# Patient Record
Sex: MALE | Race: WHITE | NOT HISPANIC OR LATINO
[De-identification: names, ages, dates, MRNs, and addresses within clinical notes are randomized per-mention and may not be internally consistent; named-entity substitution may affect disease eponyms.]

---

## 2019-06-06 ENCOUNTER — APPOINTMENT (OUTPATIENT)
Dept: DERMATOLOGY | Facility: CLINIC | Age: 84
End: 2019-06-06
Payer: MEDICARE

## 2019-06-06 PROBLEM — Z00.00 ENCOUNTER FOR PREVENTIVE HEALTH EXAMINATION: Status: ACTIVE | Noted: 2019-06-06

## 2019-06-06 PROCEDURE — 99203 OFFICE O/P NEW LOW 30 MIN: CPT

## 2019-08-14 ENCOUNTER — NON-APPOINTMENT (OUTPATIENT)
Age: 84
End: 2019-08-14

## 2019-08-14 ENCOUNTER — APPOINTMENT (OUTPATIENT)
Dept: CARDIOLOGY | Facility: CLINIC | Age: 84
End: 2019-08-14
Payer: MEDICARE

## 2019-08-14 VITALS
DIASTOLIC BLOOD PRESSURE: 84 MMHG | WEIGHT: 163 LBS | BODY MASS INDEX: 24.71 KG/M2 | RESPIRATION RATE: 14 BRPM | HEART RATE: 59 BPM | HEIGHT: 68 IN | OXYGEN SATURATION: 95 % | SYSTOLIC BLOOD PRESSURE: 160 MMHG

## 2019-08-14 DIAGNOSIS — I10 ESSENTIAL (PRIMARY) HYPERTENSION: ICD-10-CM

## 2019-08-14 DIAGNOSIS — K42.9 UMBILICAL HERNIA W/OUT OBSTRUCTION OR GANGRENE: ICD-10-CM

## 2019-08-14 DIAGNOSIS — E78.5 HYPERLIPIDEMIA, UNSPECIFIED: ICD-10-CM

## 2019-08-14 DIAGNOSIS — Z87.891 PERSONAL HISTORY OF NICOTINE DEPENDENCE: ICD-10-CM

## 2019-08-14 DIAGNOSIS — I48.92 UNSPECIFIED ATRIAL FLUTTER: ICD-10-CM

## 2019-08-14 DIAGNOSIS — R94.39 ABNORMAL RESULT OF OTHER CARDIOVASCULAR FUNCTION STUDY: ICD-10-CM

## 2019-08-14 DIAGNOSIS — Z78.9 OTHER SPECIFIED HEALTH STATUS: ICD-10-CM

## 2019-08-14 PROCEDURE — 99204 OFFICE O/P NEW MOD 45 MIN: CPT

## 2019-08-14 PROCEDURE — 93000 ELECTROCARDIOGRAM COMPLETE: CPT

## 2019-08-14 RX ORDER — UBIDECARENONE 200 MG
CAPSULE ORAL
Refills: 0 | Status: ACTIVE | COMMUNITY

## 2019-08-14 RX ORDER — ATORVASTATIN CALCIUM 40 MG/1
40 TABLET, FILM COATED ORAL
Qty: 90 | Refills: 1 | Status: ACTIVE | COMMUNITY

## 2019-08-14 RX ORDER — BIOTIN 10 MG
TABLET ORAL
Refills: 0 | Status: ACTIVE | COMMUNITY

## 2019-08-14 NOTE — DISCUSSION/SUMMARY
[FreeTextEntry1] : 1. Plan two-week event monitor to evaluate for further atrial flutter.\par 2. Will plan to check CT of the coronaries to further evaluate his abnormal stress test.\par 3. Continue anticoagulation for stroke prophylaxis for atrial fibrillation. He will continue off aspirin. \par 4. Continue other current cardiac meds in doses as noted above for CAD, hypertension, atrial flutter.\par 5. Follow up here in six weeks.

## 2019-08-14 NOTE — PHYSICAL EXAM
[General Appearance - In No Acute Distress] : no acute distress [Normal Conjunctiva] : the conjunctiva exhibited no abnormalities [Normal Oral Mucosa] : normal oral mucosa [Auscultation Breath Sounds / Voice Sounds] : lungs were clear to auscultation bilaterally [Abdomen Soft] : soft [Abdomen Tenderness] : non-tender [Cyanosis, Localized] : no localized cyanosis [Nail Clubbing] : no clubbing of the fingernails [Abnormal Walk] : normal gait [Skin Color & Pigmentation] : normal skin color and pigmentation [Oriented To Time, Place, And Person] : oriented to person, place, and time [Affect] : the affect was normal [Normal Rate] : normal [Irregularly Irregular] : irregularly irregular [Normal S2] : normal S2 [Normal S1] : normal S1 [No Murmur] : no murmurs heard [FreeTextEntry1] : No JVD, no carotid bruits. [S3] : no S3 [S4] : no S4

## 2019-08-14 NOTE — ASSESSMENT
[FreeTextEntry1] : EKG: Sinus rhythm with atrial and ventricular ectopic beats. No ST or T wave changes.\par \par 89-year-old man with a past medical history of hypertension and dyslipidemia who recently presented for hernia surgery and was found to be in rapid atrial flutter. Patient was successfully cardioverted back to sinus rhythm and remains in sinus rhythm here today with PACs and PVCs. He is now asymptomatic. He apparently had some dizziness associated with the atrial flutter but no other symptoms. No evidence of congestive heart failure and his echo at that time demonstrated a normal EF with mild to moderate mitral regurgitation. He did have a stress test prior to surgery which showed a reversible inferior defect. It was determined at that time that no further workup would be done he would be treated medically.

## 2019-08-14 NOTE — HISTORY OF PRESENT ILLNESS
[FreeTextEntry1] : Patient comes to the office today to change cardiologists. He was recently seen at [facility] where he presented for hernia surgery. He had been cleared by his prior cardiologist including a nuclear stress test which demonstrated reversible inferior defect which was ultimately decided to be treated medically with no further workup. When he arrived for his surgery he was noted to be tachycardic and found to be in rapid atrial flutter. He underwent MAYKEL guided DC cardioversion successfully back to sinus rhythm. He was kept for a day and discharged home on Eliquis. He notes that he had no real symptoms prior to coming in but that he had been feeling a little bit lightheaded at times in the days leading up to this episode. Since the cardioversion he has had no further lightheadedness. He reports no palpitations at all. Patient denies chest pain, shortness of breath, orthopnea, presyncope, syncope.

## 2019-08-23 ENCOUNTER — RX RENEWAL (OUTPATIENT)
Age: 84
End: 2019-08-23

## 2019-08-29 ENCOUNTER — FORM ENCOUNTER (OUTPATIENT)
Age: 84
End: 2019-08-29

## 2019-08-30 ENCOUNTER — OUTPATIENT (OUTPATIENT)
Dept: OUTPATIENT SERVICES | Facility: HOSPITAL | Age: 84
LOS: 1 days | End: 2019-08-30
Payer: MEDICARE

## 2019-08-30 DIAGNOSIS — R94.39 ABNORMAL RESULT OF OTHER CARDIOVASCULAR FUNCTION STUDY: ICD-10-CM

## 2019-08-30 PROCEDURE — 75571 CT HRT W/O DYE W/CA TEST: CPT | Mod: 26

## 2019-08-30 PROCEDURE — 75571 CT HRT W/O DYE W/CA TEST: CPT

## 2019-09-06 DIAGNOSIS — R93.1 ABNORMAL FINDINGS ON DIAGNOSTIC IMAGING OF HEART AND CORONARY CIRCULATION: ICD-10-CM

## 2019-09-11 ENCOUNTER — APPOINTMENT (OUTPATIENT)
Dept: DERMATOLOGY | Facility: CLINIC | Age: 84
End: 2019-09-11

## 2019-10-07 ENCOUNTER — APPOINTMENT (OUTPATIENT)
Dept: CARDIOLOGY | Facility: CLINIC | Age: 84
End: 2019-10-07

## 2019-10-12 ENCOUNTER — TRANSCRIPTION ENCOUNTER (OUTPATIENT)
Age: 84
End: 2019-10-12

## 2019-10-28 ENCOUNTER — RX RENEWAL (OUTPATIENT)
Age: 84
End: 2019-10-28

## 2019-10-29 RX ORDER — APIXABAN 5 MG/1
5 TABLET, FILM COATED ORAL
Qty: 180 | Refills: 0 | Status: ACTIVE | COMMUNITY
Start: 1900-01-01 | End: 1900-01-01

## 2019-10-29 RX ORDER — METOPROLOL SUCCINATE 25 MG/1
25 TABLET, EXTENDED RELEASE ORAL
Qty: 45 | Refills: 0 | Status: ACTIVE | COMMUNITY
Start: 1900-01-01 | End: 1900-01-01

## 2021-10-22 ENCOUNTER — TRANSCRIPTION ENCOUNTER (OUTPATIENT)
Age: 86
End: 2021-10-22

## 2022-12-22 ENCOUNTER — APPOINTMENT (OUTPATIENT)
Dept: SURGICAL ONCOLOGY | Facility: CLINIC | Age: 87
End: 2022-12-22

## 2022-12-22 VITALS
HEART RATE: 69 BPM | WEIGHT: 155 LBS | SYSTOLIC BLOOD PRESSURE: 155 MMHG | TEMPERATURE: 97.1 F | HEIGHT: 68 IN | BODY MASS INDEX: 23.49 KG/M2 | OXYGEN SATURATION: 93 % | DIASTOLIC BLOOD PRESSURE: 93 MMHG

## 2022-12-22 PROCEDURE — 99205 OFFICE O/P NEW HI 60 MIN: CPT

## 2022-12-27 NOTE — ASSESSMENT
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 We discussed the risks, benefits, and alternatives of the procedure with the patient, he expressed understanding and agree to proceed.

## 2022-12-27 NOTE — CONSULT LETTER
[Dear  ___] : Dear  [unfilled], [Consult Letter:] : I had the pleasure of evaluating your patient, [unfilled]. [Please see my note below.] : Please see my note below. [Consult Closing:] : Thank you very much for allowing me to participate in the care of this patient.  If you have any questions, please do not hesitate to contact me. [Sincerely,] : Sincerely, [FreeTextEntry3] : Sonny Edgar MD, MPH, FACS, FSSO\par , [facility]\par [facility]\par Associate Professor of Surgery\par [employer] [facility]

## 2022-12-27 NOTE — HISTORY OF PRESENT ILLNESS
[de-identified] : Mr. Francisco Castellanos is a 89 year old male with newly diagnosed squamous cell carcinoma. Referred by Dr. Funez.\par \par Francisco had a shave biopsy of the right central malar cheek performed on 10/24/22 which revealed invasive squamous cell carcinoma.\par \par Reports long standing history of sun exposure without the use of sunscreen. History of sunburns. Denies any other concerning lesions or masses. Denies bleeding or pruritic moles. Denies pain or constitutional changes. 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 He is  for 9 years with 3 children. \par \par He is currently on Eliquis for AFib 5 mg 2 x per day. 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 Loya at [facility] and finished up his 30 RT last week.

## 2022-12-27 NOTE — REASON FOR VISIT
[Initial Consultation] : an initial consultation for [FreeTextEntry2] : Invasive squamous cell carcinoma

## 2023-01-11 ENCOUNTER — RESULT REVIEW (OUTPATIENT)
Age: 88
End: 2023-01-11

## 2023-01-12 ENCOUNTER — OUTPATIENT (OUTPATIENT)
Dept: OUTPATIENT SERVICES | Facility: HOSPITAL | Age: 88
LOS: 1 days | End: 2023-01-12
Payer: MEDICARE

## 2023-01-12 DIAGNOSIS — C80.1 MALIGNANT (PRIMARY) NEOPLASM, UNSPECIFIED: ICD-10-CM

## 2023-01-12 LAB — SURGICAL PATHOLOGY STUDY: SIGNIFICANT CHANGE UP

## 2023-01-12 PROCEDURE — 88321 CONSLTJ&REPRT SLD PREP ELSWR: CPT

## 2023-01-13 ENCOUNTER — OUTPATIENT (OUTPATIENT)
Dept: OUTPATIENT SERVICES | Facility: HOSPITAL | Age: 88
LOS: 1 days | End: 2023-01-13
Payer: MEDICARE

## 2023-01-13 VITALS
RESPIRATION RATE: 16 BRPM | SYSTOLIC BLOOD PRESSURE: 118 MMHG | DIASTOLIC BLOOD PRESSURE: 70 MMHG | TEMPERATURE: 98 F | WEIGHT: 158.73 LBS | HEART RATE: 64 BPM | HEIGHT: 68 IN | OXYGEN SATURATION: 98 %

## 2023-01-13 DIAGNOSIS — C44.329 SQUAMOUS CELL CARCINOMA OF SKIN OF OTHER PARTS OF FACE: ICD-10-CM

## 2023-01-13 DIAGNOSIS — Z98.890 OTHER SPECIFIED POSTPROCEDURAL STATES: Chronic | ICD-10-CM

## 2023-01-13 DIAGNOSIS — E78.5 HYPERLIPIDEMIA, UNSPECIFIED: ICD-10-CM

## 2023-01-13 DIAGNOSIS — I48.91 UNSPECIFIED ATRIAL FIBRILLATION: ICD-10-CM

## 2023-01-13 DIAGNOSIS — Z90.49 ACQUIRED ABSENCE OF OTHER SPECIFIED PARTS OF DIGESTIVE TRACT: Chronic | ICD-10-CM

## 2023-01-13 DIAGNOSIS — Z01.818 ENCOUNTER FOR OTHER PREPROCEDURAL EXAMINATION: ICD-10-CM

## 2023-01-13 DIAGNOSIS — I10 ESSENTIAL (PRIMARY) HYPERTENSION: ICD-10-CM

## 2023-01-13 DIAGNOSIS — Z90.89 ACQUIRED ABSENCE OF OTHER ORGANS: Chronic | ICD-10-CM

## 2023-01-13 DIAGNOSIS — Z29.9 ENCOUNTER FOR PROPHYLACTIC MEASURES, UNSPECIFIED: ICD-10-CM

## 2023-01-13 LAB
A1C WITH ESTIMATED AVERAGE GLUCOSE RESULT: 5.7 % — HIGH (ref 4–5.6)
ANION GAP SERPL CALC-SCNC: 10 MMOL/L — SIGNIFICANT CHANGE UP (ref 5–17)
APTT BLD: 33.1 SEC — SIGNIFICANT CHANGE UP (ref 27.5–35.5)
BLD GP AB SCN SERPL QL: SIGNIFICANT CHANGE UP
BUN SERPL-MCNC: 15.9 MG/DL — SIGNIFICANT CHANGE UP (ref 8–20)
CALCIUM SERPL-MCNC: 8.9 MG/DL — SIGNIFICANT CHANGE UP (ref 8.4–10.5)
CHLORIDE SERPL-SCNC: 105 MMOL/L — SIGNIFICANT CHANGE UP (ref 96–108)
CO2 SERPL-SCNC: 26 MMOL/L — SIGNIFICANT CHANGE UP (ref 22–29)
CREAT SERPL-MCNC: 1.09 MG/DL — SIGNIFICANT CHANGE UP (ref 0.5–1.3)
EGFR: 63 ML/MIN/1.73M2 — SIGNIFICANT CHANGE UP
ESTIMATED AVERAGE GLUCOSE: 117 MG/DL — HIGH (ref 68–114)
GLUCOSE SERPL-MCNC: 97 MG/DL — SIGNIFICANT CHANGE UP (ref 70–99)
HCT VFR BLD CALC: 42.8 % — SIGNIFICANT CHANGE UP (ref 39–50)
HGB BLD-MCNC: 13.7 G/DL — SIGNIFICANT CHANGE UP (ref 13–17)
INR BLD: 1.26 RATIO — HIGH (ref 0.88–1.16)
MCHC RBC-ENTMCNC: 32 GM/DL — SIGNIFICANT CHANGE UP (ref 32–36)
MCHC RBC-ENTMCNC: 32.1 PG — SIGNIFICANT CHANGE UP (ref 27–34)
MCV RBC AUTO: 100.2 FL — HIGH (ref 80–100)
PLATELET # BLD AUTO: 272 K/UL — SIGNIFICANT CHANGE UP (ref 150–400)
POTASSIUM SERPL-MCNC: 4.9 MMOL/L — SIGNIFICANT CHANGE UP (ref 3.5–5.3)
POTASSIUM SERPL-SCNC: 4.9 MMOL/L — SIGNIFICANT CHANGE UP (ref 3.5–5.3)
PROTHROM AB SERPL-ACNC: 14.7 SEC — HIGH (ref 10.5–13.4)
RBC # BLD: 4.27 M/UL — SIGNIFICANT CHANGE UP (ref 4.2–5.8)
RBC # FLD: 13.9 % — SIGNIFICANT CHANGE UP (ref 10.3–14.5)
SODIUM SERPL-SCNC: 141 MMOL/L — SIGNIFICANT CHANGE UP (ref 135–145)
WBC # BLD: 7.51 K/UL — SIGNIFICANT CHANGE UP (ref 3.8–10.5)
WBC # FLD AUTO: 7.51 K/UL — SIGNIFICANT CHANGE UP (ref 3.8–10.5)

## 2023-01-13 PROCEDURE — G0463: CPT

## 2023-01-13 RX ORDER — SODIUM CHLORIDE 9 MG/ML
3 INJECTION INTRAMUSCULAR; INTRAVENOUS; SUBCUTANEOUS ONCE
Refills: 0 | Status: DISCONTINUED | OUTPATIENT
Start: 2023-01-18 | End: 2023-02-02

## 2023-01-13 RX ORDER — VANCOMYCIN HCL 1 G
1000 VIAL (EA) INTRAVENOUS ONCE
Refills: 0 | Status: DISCONTINUED | OUTPATIENT
Start: 2023-01-18 | End: 2023-02-02

## 2023-01-13 NOTE — H&P PST ADULT - HISTORY OF PRESENT ILLNESS
89 year old male with newly diagnosed squamous cell carcinoma, he had a shave biopsy of the right central malar cheek performed on 10/24/22 which revealed invasive squamous cell carcinoma. Reports long standing history of sun exposure without the use of sunscreen. History of sunburns. Denies any other concerning lesions or masses. Denies bleeding or pruritic moles. Denies pain or constitutional changes. e is scheduled for a Wide excision of Right Malar by Dr. Edgar on 1/18/23.      89 year old male with newly diagnosed squamous cell carcinoma, he had a shave biopsy of the right central malar cheek performed on 10/24/22 which revealed invasive squamous cell carcinoma. Reports long standing history of sun exposure without the use of sunscreen. History of sunburns. Denies any other concerning lesions or masses. Denies bleeding or pruritic moles. Denies pain or constitutional changes. he is scheduled for a Wide local excision of Right central Malar cheek squamous cell carcinoma with plastics by Dr. Edgar on 1/18/23.      89 year old male with PMHX of HTN, Afib on Eliquis, high cholesterol here for PST with newly diagnosed squamous cell carcinoma, he had a shave biopsy of the right central malar cheek performed on 10/24/22 which revealed invasive squamous cell carcinoma. Reports long standing history of sun exposure without the use of sunscreen. History of sunburns. Denies any other concerning lesions or masses. Denies bleeding or pruritic moles. Denies pain or constitutional changes. he is scheduled for a Wide local excision of Right central Malar cheek squamous cell carcinoma with plastics by Dr. Edgar on 1/18/23.      89 year old male with PMHX of HTN, Afib on Eliquis, high cholesterol here for PST with newly diagnosed squamous cell carcinoma, he had a shave biopsy of the right central malar cheek performed on 10/24/22 which revealed invasive squamous cell carcinoma. Reports long standing history of sun exposure without the use of sunscreen. History of sunburns. Denies any other concerning lesions or masses. Denies bleeding or pruritic moles. Denies pain or constitutional changes. he is scheduled for a Wide local excision of Right central Malar cheek squamous cell carcinoma with plastics by Dr. Edgar on 1/18/23. Covid vaccine series completed, card in chart, covid test was done today. cardiac clearance pending

## 2023-01-13 NOTE — H&P PST ADULT - ATTENDING COMMENTS
Risks, benefits, and alternatives discussed with the patient - he expressed understanding and agrees to proceed with resection of right malar cheek squamous cell carcinoma.

## 2023-01-13 NOTE — H&P PST ADULT - SKIN COMMENTS
noted a area where the biopsy was done on the right cheek, also noted a large scar on the left scalp

## 2023-01-13 NOTE — H&P PST ADULT - ASSESSMENT
CAPRINI VTE 2.0 SCORE [CLOT updated 2019]    AGE RELATED RISK FACTORS                                                       MOBILITY RELATED FACTORS  [ ] Age 41-60 years                                            (1 Point)                    [ ] Bed rest                                                        (1 Point)  [ ] Age: 61-74 years                                           (2 Points)                  [ ] Plaster cast                                                   (2 Points)  [ ] Age= 75 years                                              (3 Points)                    [ ] Bed bound for more than 72 hours                 (2 Points)    DISEASE RELATED RISK FACTORS                                               GENDER SPECIFIC FACTORS  [ ] Edema in the lower extremities                       (1 Point)              [ ] Pregnancy                                                     (1 Point)  [ ] Varicose veins                                               (1 Point)                     [ ] Post-partum < 6 weeks                                   (1 Point)             [ ] BMI > 25 Kg/m2                                            (1 Point)                     [ ] Hormonal therapy  or oral contraception          (1 Point)                 [ ] Sepsis (in the previous month)                        (1 Point)               [ ] History of pregnancy complications                 (1 point)  [ ] Pneumonia or serious lung disease                                               [ ] Unexplained or recurrent                     (1 Point)           (in the previous month)                               (1 Point)  [ ] Abnormal pulmonary function test                     (1 Point)                 SURGERY RELATED RISK FACTORS  [ ] Acute myocardial infarction                              (1 Point)               [ ]  Section                                             (1 Point)  [ ] Congestive heart failure (in the previous month)  (1 Point)      [ ] Minor surgery                                                  (1 Point)   [ ] Inflammatory bowel disease                             (1 Point)               [ ] Arthroscopic surgery                                        (2 Points)  [ ] Central venous access                                      (2 Points)                [ ] General surgery lasting more than 45 minutes (2 points)  [ ] Malignancy- Present or previous                   (2 Points)                [ ] Elective arthroplasty                                         (5 points)    [ ] Stroke (in the previous month)                          (5 Points)                                                                                                                                                           HEMATOLOGY RELATED FACTORS                                                 TRAUMA RELATED RISK FACTORS  [ ] Prior episodes of VTE                                     (3 Points)                [ ] Fracture of the hip, pelvis, or leg                       (5 Points)  [ ] Positive family history for VTE                         (3 Points)             [ ] Acute spinal cord injury (in the previous month)  (5 Points)  [ ] Prothrombin 84399 A                                     (3 Points)               [ ] Paralysis  (less than 1 month)                             (5 Points)  [ ] Factor V Leiden                                             (3 Points)                  [ ] Multiple Trauma within 1 month                        (5 Points)  [ ] Lupus anticoagulants                                     (3 Points)                                                           [ ] Anticardiolipin antibodies                               (3 Points)                                                       [ ] High homocysteine in the blood                      (3 Points)                                             [ ] Other congenital or acquired thrombophilia      (3 Points)                                                [ ] Heparin induced thrombocytopenia                  (3 Points)                                     Total Score [          ]  OPIOID RISK TOOL    FRANKIE EACH BOX THAT APPLIES AND ADD TOTALS AT THE END    FAMILY HISTORY OF SUBSTANCE ABUSE                 FEMALE         MALE                                                Alcohol                             [  ]1 pt          [  ]3pts                                               Illegal Drugs                     [  ]2 pts        [  ]3pts                                               Rx Drugs                           [  ]4 pts        [  ]4 pts    PERSONAL HISTORY OF SUBSTANCE ABUSE                                                                                          Alcohol                             [  ]3 pts       [  ]3 pts                                               Illegal Drugs                     [  ]4 pts        [  ]4 pts                                               Rx Drugs                           [  ]5 pts        [  ]5 pts    AGE BETWEEN 16-45 YEARS                                      [  ]1 pt         [  ]1 pt    HISTORY OF PREADOLESCENT   SEXUAL ABUSE                                                             [  ]3 pts        [  ]0pts    PSYCHOLOGICAL DISEASE                     ADD, OCD, Bipolar, Schizophrenia        [  ]2 pts         [  ]2 pts                      Depression                                               [  ]1 pt           [  ]1 pt           SCORING TOTAL   (add numbers and type here)              ( 0)                                     A score of 3 or lower indicated LOW risk for future opioid abuse  A score of 4 to 7 indicated moderate risk for future opioid abuse  A score of 8 or higher indicates a high risk for opioid abuse 89 year old male with PMHX of HTN, Afib on Eliquis, high cholesterol here for PST with newly diagnosed squamous cell carcinoma, he had a shave biopsy of the right central malar cheek performed on 10/24/22 which revealed invasive squamous cell carcinoma. Reports long standing history of sun exposure without the use of sunscreen. History of sunburns. Denies any other concerning lesions or masses. Denies bleeding or pruritic moles. Denies pain or constitutional changes. he is scheduled for a Wide local excision of Right central Malar cheek squamous cell carcinoma with plastics by Dr. Edgar on 23. Covid vaccine series completed, card in chart, covid test was done today. cardiac clearance pending   medications reviewed, instructions given on what medications to take and what not to take. Asked the patient to take the Blood pressure medication/ heart medication or any other important meds with a sip of water in the AM of surgery (Metoprolol, Diltiazem) Asked the patient to consult with Dr. Gomez cardiologist about holding Eliquis  and stop/Hold it accordingly, failure to do so may result in cancellation or postponement of your surgery, pt  agreed and verbalized understanding. (pt states that he was told to stop it by  prior) All other meds can be continued till the night before surgery. Asked the pt not to take any NSAID's 5-7 days before surgery and told the pt Tylenol is okay to take for pain, pt verbalized understanding.     CAPRINI VTE 2.0 SCORE [CLOT updated 2019]    AGE RELATED RISK FACTORS                                                       MOBILITY RELATED FACTORS  [ ] Age 41-60 years                                            (1 Point)                    [ ] Bed rest                                                        (1 Point)  [ ] Age: 61-74 years                                           (2 Points)                  [ ] Plaster cast                                                   (2 Points)  [ x] Age= 75 years                                              (3 Points)                    [ ] Bed bound for more than 72 hours                 (2 Points)    DISEASE RELATED RISK FACTORS                                               GENDER SPECIFIC FACTORS  [x ] Edema in the lower extremities                       (1 Point)              [ ] Pregnancy                                                     (1 Point)  [ ] Varicose veins                                               (1 Point)                     [ ] Post-partum < 6 weeks                                   (1 Point)             [ ] BMI > 25 Kg/m2                                            (1 Point)                     [ ] Hormonal therapy  or oral contraception          (1 Point)                 [ ] Sepsis (in the previous month)                        (1 Point)               [ ] History of pregnancy complications                 (1 point)  [ ] Pneumonia or serious lung disease                                               [ ] Unexplained or recurrent                     (1 Point)           (in the previous month)                               (1 Point)  [ ] Abnormal pulmonary function test                     (1 Point)                 SURGERY RELATED RISK FACTORS  [ ] Acute myocardial infarction                              (1 Point)               [ ]  Section                                             (1 Point)  [ ] Congestive heart failure (in the previous month)  (1 Point)      [ ] Minor surgery                                                  (1 Point)   [ ] Inflammatory bowel disease                             (1 Point)               [ ] Arthroscopic surgery                                        (2 Points)  [ ] Central venous access                                      (2 Points)                [ x] General surgery lasting more than 45 minutes (2 points)  [ x] Malignancy- Present or previous                   (2 Points)                [ ] Elective arthroplasty                                         (5 points)    [ ] Stroke (in the previous month)                          (5 Points)                                                                                                                                                           HEMATOLOGY RELATED FACTORS                                                 TRAUMA RELATED RISK FACTORS  [ ] Prior episodes of VTE                                     (3 Points)                [ ] Fracture of the hip, pelvis, or leg                       (5 Points)  [ ] Positive family history for VTE                         (3 Points)             [ ] Acute spinal cord injury (in the previous month)  (5 Points)  [ ] Prothrombin 67786 A                                     (3 Points)               [ ] Paralysis  (less than 1 month)                             (5 Points)  [ ] Factor V Leiden                                             (3 Points)                  [ ] Multiple Trauma within 1 month                        (5 Points)  [ ] Lupus anticoagulants                                     (3 Points)                                                           [ ] Anticardiolipin antibodies                               (3 Points)                                                       [ ] High homocysteine in the blood                      (3 Points)                                             [ ] Other congenital or acquired thrombophilia      (3 Points)                                                [ ] Heparin induced thrombocytopenia                  (3 Points)                                     Total Score [   8       ]  OPIOID RISK TOOL    FRANKIE EACH BOX THAT APPLIES AND ADD TOTALS AT THE END    FAMILY HISTORY OF SUBSTANCE ABUSE                 FEMALE         MALE                                                Alcohol                             [  ]1 pt          [  ]3pts                                               Illegal Drugs                     [  ]2 pts        [  ]3pts                                               Rx Drugs                           [  ]4 pts        [  ]4 pts    PERSONAL HISTORY OF SUBSTANCE ABUSE                                                                                          Alcohol                             [  ]3 pts       [  ]3 pts                                               Illegal Drugs                     [  ]4 pts        [  ]4 pts                                               Rx Drugs                           [  ]5 pts        [  ]5 pts    AGE BETWEEN 16-45 YEARS                                      [  ]1 pt         [  ]1 pt    HISTORY OF PREADOLESCENT   SEXUAL ABUSE                                                             [  ]3 pts        [  ]0pts    PSYCHOLOGICAL DISEASE                     ADD, OCD, Bipolar, Schizophrenia        [  ]2 pts         [  ]2 pts                      Depression                                               [  ]1 pt           [  ]1 pt           SCORING TOTAL   (add numbers and type here)              ( 0)                                     A score of 3 or lower indicated LOW risk for future opioid abuse  A score of 4 to 7 indicated moderate risk for future opioid abuse  A score of 8 or higher indicates a high risk for opioid abuse

## 2023-01-13 NOTE — H&P PST ADULT - NSICDXPASTSURGICALHX_GEN_ALL_CORE_FT
PAST SURGICAL HISTORY:  H/O melanoma excision     History of appendectomy     History of tonsillectomy

## 2023-01-13 NOTE — H&P PST ADULT - PROBLEM SELECTOR PLAN 5
Wide local excision of Right central Malar cheek squamous cell carcinoma with plastics by Dr. Edgar on 1/18/23. Covid vaccine series completed, card in chart, covid test was done today. cardiac clearance pending

## 2023-01-14 LAB — SARS-COV-2 RNA SPEC QL NAA+PROBE: SIGNIFICANT CHANGE UP

## 2023-01-17 ENCOUNTER — TRANSCRIPTION ENCOUNTER (OUTPATIENT)
Age: 88
End: 2023-01-17

## 2023-01-18 ENCOUNTER — RESULT REVIEW (OUTPATIENT)
Age: 88
End: 2023-01-18

## 2023-01-18 ENCOUNTER — TRANSCRIPTION ENCOUNTER (OUTPATIENT)
Age: 88
End: 2023-01-18

## 2023-01-18 ENCOUNTER — APPOINTMENT (OUTPATIENT)
Dept: SURGICAL ONCOLOGY | Facility: HOSPITAL | Age: 88
End: 2023-01-18

## 2023-01-18 ENCOUNTER — OUTPATIENT (OUTPATIENT)
Dept: INPATIENT UNIT | Facility: HOSPITAL | Age: 88
LOS: 1 days | Discharge: HOME | End: 2023-01-18
Payer: MEDICARE

## 2023-01-18 VITALS
OXYGEN SATURATION: 100 % | RESPIRATION RATE: 16 BRPM | SYSTOLIC BLOOD PRESSURE: 171 MMHG | WEIGHT: 154.98 LBS | HEIGHT: 68 IN | DIASTOLIC BLOOD PRESSURE: 92 MMHG | HEART RATE: 62 BPM | TEMPERATURE: 97 F

## 2023-01-18 VITALS
OXYGEN SATURATION: 98 % | HEART RATE: 62 BPM | TEMPERATURE: 97 F | SYSTOLIC BLOOD PRESSURE: 163 MMHG | RESPIRATION RATE: 14 BRPM | DIASTOLIC BLOOD PRESSURE: 79 MMHG

## 2023-01-18 DIAGNOSIS — Z98.890 OTHER SPECIFIED POSTPROCEDURAL STATES: Chronic | ICD-10-CM

## 2023-01-18 DIAGNOSIS — C44.329 SQUAMOUS CELL CARCINOMA OF SKIN OF OTHER PARTS OF FACE: ICD-10-CM

## 2023-01-18 DIAGNOSIS — Z90.49 ACQUIRED ABSENCE OF OTHER SPECIFIED PARTS OF DIGESTIVE TRACT: Chronic | ICD-10-CM

## 2023-01-18 DIAGNOSIS — Z90.89 ACQUIRED ABSENCE OF OTHER ORGANS: Chronic | ICD-10-CM

## 2023-01-18 LAB — ABO RH CONFIRMATION: SIGNIFICANT CHANGE UP

## 2023-01-18 PROCEDURE — 11643 EXC F/E/E/N/L MAL+MRG 2.1-3: CPT

## 2023-01-18 PROCEDURE — 88305 TISSUE EXAM BY PATHOLOGIST: CPT | Mod: 26

## 2023-01-18 PROCEDURE — C9399: CPT

## 2023-01-18 PROCEDURE — 36415 COLL VENOUS BLD VENIPUNCTURE: CPT

## 2023-01-18 PROCEDURE — 88305 TISSUE EXAM BY PATHOLOGIST: CPT

## 2023-01-18 PROCEDURE — 14040 TIS TRNFR F/C/C/M/N/A/G/H/F: CPT

## 2023-01-18 RX ORDER — FENTANYL CITRATE 50 UG/ML
25 INJECTION INTRAVENOUS
Refills: 0 | Status: DISCONTINUED | OUTPATIENT
Start: 2023-01-18 | End: 2023-01-18

## 2023-01-18 RX ORDER — OXYCODONE HYDROCHLORIDE 5 MG/1
5 TABLET ORAL ONCE
Refills: 0 | Status: DISCONTINUED | OUTPATIENT
Start: 2023-01-18 | End: 2023-01-18

## 2023-01-18 RX ORDER — ATORVASTATIN CALCIUM 80 MG/1
1 TABLET, FILM COATED ORAL
Qty: 0 | Refills: 0 | DISCHARGE

## 2023-01-18 RX ORDER — SODIUM CHLORIDE 9 MG/ML
1000 INJECTION, SOLUTION INTRAVENOUS
Refills: 0 | Status: DISCONTINUED | OUTPATIENT
Start: 2023-01-18 | End: 2023-01-18

## 2023-01-18 RX ORDER — DILTIAZEM HCL 120 MG
1 CAPSULE, EXT RELEASE 24 HR ORAL
Qty: 0 | Refills: 0 | DISCHARGE

## 2023-01-18 RX ORDER — ONDANSETRON 8 MG/1
4 TABLET, FILM COATED ORAL ONCE
Refills: 0 | Status: DISCONTINUED | OUTPATIENT
Start: 2023-01-18 | End: 2023-01-18

## 2023-01-18 RX ORDER — METOPROLOL TARTRATE 50 MG
1 TABLET ORAL
Qty: 0 | Refills: 0 | DISCHARGE

## 2023-01-18 RX ORDER — APIXABAN 2.5 MG/1
1 TABLET, FILM COATED ORAL
Qty: 0 | Refills: 0 | DISCHARGE

## 2023-01-18 RX ORDER — ACETAMINOPHEN 500 MG
975 TABLET ORAL ONCE
Refills: 0 | Status: COMPLETED | OUTPATIENT
Start: 2023-01-18 | End: 2023-01-18

## 2023-01-18 RX ADMIN — Medication 975 MILLIGRAM(S): at 13:38

## 2023-01-18 NOTE — BRIEF OPERATIVE NOTE - NSICDXBRIEFPROCEDURE_GEN_ALL_CORE_FT
PROCEDURES:  Excision of malignant lesion of face, 2.1 to 3.0 cm in diameter 18-Jan-2023 16:50:39 squamous cell carcinoma Glenn Michael

## 2023-01-18 NOTE — ASU DISCHARGE PLAN (ADULT/PEDIATRIC) - MEDICATION INSTRUCTIONS
1000mg tylenol every 6 hours as needed; ibuprofen 400mg-600mg every 6 hours as needed (take with food)

## 2023-01-18 NOTE — ASU DISCHARGE PLAN (ADULT/PEDIATRIC) - PROVIDER TOKENS
PROVIDER:[TOKEN:[[id number]],FOLLOWUP:[2 weeks]],PROVIDER:[TOKEN:[[id number]],FOLLOWUP:[1 week]]

## 2023-01-18 NOTE — ASU PREOP CHECKLIST - AS TEMP SITE
"Pt c/o spasms to BLE and BUE described as "seizure like activity" by pt. Pt reports having 7 episodes today. Pt denies HA, LOC, changes in vision, sensory changes, abdominal pain, changes in urination/BM, fall, hitting head.   " temporal

## 2023-01-18 NOTE — ASU DISCHARGE PLAN (ADULT/PEDIATRIC) - CARE PROVIDER_API CALL
Sonny Edgar)  Complex General Surgical Oncology; Surgery; Surgical Oncology  [address] [place] [address]  Phone: [phone]  Fax: [phone]  Follow Up Time: 2 weeks    Joshua Roach)  Plastic Surgery  [address] [place] [address]  Phone: [phone]  Fax: [phone]  Follow Up Time: 1 week

## 2023-01-18 NOTE — BRIEF OPERATIVE NOTE - SPECIMENS
Bedside shift report received from [facility], RN included SBAR. squamous cell carcinoma of right cheek, stich marking short superior and long lateral

## 2023-01-19 PROBLEM — E78.5 HYPERLIPIDEMIA, UNSPECIFIED: Chronic | Status: ACTIVE | Noted: 2023-01-13

## 2023-01-19 PROBLEM — I10 ESSENTIAL (PRIMARY) HYPERTENSION: Chronic | Status: ACTIVE | Noted: 2023-01-13

## 2023-01-19 PROBLEM — I48.91 UNSPECIFIED ATRIAL FIBRILLATION: Chronic | Status: ACTIVE | Noted: 2023-01-13

## 2023-01-22 LAB — SURGICAL PATHOLOGY STUDY: SIGNIFICANT CHANGE UP

## 2023-01-26 PROBLEM — C44.329 SQUAMOUS CELL CARCINOMA OF CHEEK: Status: ACTIVE | Noted: 2022-12-19

## 2023-02-02 ENCOUNTER — APPOINTMENT (OUTPATIENT)
Dept: SURGICAL ONCOLOGY | Facility: CLINIC | Age: 88
End: 2023-02-02
Payer: MEDICARE

## 2023-02-02 VITALS
DIASTOLIC BLOOD PRESSURE: 85 MMHG | BODY MASS INDEX: 23.49 KG/M2 | HEIGHT: 68 IN | WEIGHT: 155 LBS | OXYGEN SATURATION: 98 % | HEART RATE: 61 BPM | SYSTOLIC BLOOD PRESSURE: 143 MMHG

## 2023-02-02 DIAGNOSIS — C44.329 SQUAMOUS CELL CARCINOMA OF SKIN OF OTHER PARTS OF FACE: ICD-10-CM

## 2023-02-02 PROCEDURE — 99024 POSTOP FOLLOW-UP VISIT: CPT

## 2023-02-08 NOTE — ASSESSMENT
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

## 2023-02-08 NOTE — CONSULT LETTER
[Dear  ___] : Dear  [unfilled], [Consult Letter:] : I had the pleasure of evaluating your patient, [unfilled]. [Please see my note below.] : Please see my note below. [Consult Closing:] : Thank you very much for allowing me to participate in the care of this patient.  If you have any questions, please do not hesitate to contact me. [Sincerely,] : Sincerely, [FreeTextEntry3] : Sonny Edgar MD, MPH, FACS, FSSO\par , [facility]\par [facility] [place]\par Associate Professor of Surgery\par [employer] at [facility]

## 2023-02-08 NOTE — HISTORY OF PRESENT ILLNESS
[de-identified] : Mr. Francisco Castellanos is a 89 year old male who presents for a follow up visit\par \par Francisco had a shave biopsy of the right central malar cheek performed on 10/24/22 which revealed invasive squamous cell carcinoma.\par \par Reports long standing history of sun exposure without the use of sunscreen. History of sunburns. Denies any other concerning lesions or masses. Denies bleeding or pruritic moles. Denies pain or constitutional changes. 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 He is  for 9 years with 3 children. \par \par He is currently on Eliquis for AFib 5 mg 2 x per day. 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 Loya at [facility] and finished up his 30 RT last week. \par \par INTERVAL HISTORY:\par \par ****SURGERY: s/p surgical excision of squamous cell carcinoma of right cheek on 1/18/23\par ****PATHOLOGY: Invasive well differentiated squamous cell carcinoma, 0.8 cm, negative margins\par \par 2/2/23- Recovering appropriately. Denies fevers, chills, or pain. Incision healing well without evidence of seroma, hematoma, or infection

## 2023-11-10 ENCOUNTER — EMERGENCY (EMERGENCY)
Facility: HOSPITAL | Age: 88
LOS: 1 days | Discharge: HOME | End: 2023-11-10
Attending: EMERGENCY MEDICINE
Payer: MEDICARE

## 2023-11-10 VITALS
DIASTOLIC BLOOD PRESSURE: 87 MMHG | TEMPERATURE: 98 F | OXYGEN SATURATION: 96 % | HEIGHT: 68 IN | WEIGHT: 159.61 LBS | HEART RATE: 125 BPM | RESPIRATION RATE: 18 BRPM | SYSTOLIC BLOOD PRESSURE: 129 MMHG

## 2023-11-10 VITALS — HEART RATE: 82 BPM | DIASTOLIC BLOOD PRESSURE: 82 MMHG | SYSTOLIC BLOOD PRESSURE: 115 MMHG

## 2023-11-10 DIAGNOSIS — Z98.890 OTHER SPECIFIED POSTPROCEDURAL STATES: Chronic | ICD-10-CM

## 2023-11-10 DIAGNOSIS — Z90.89 ACQUIRED ABSENCE OF OTHER ORGANS: Chronic | ICD-10-CM

## 2023-11-10 DIAGNOSIS — Z90.49 ACQUIRED ABSENCE OF OTHER SPECIFIED PARTS OF DIGESTIVE TRACT: Chronic | ICD-10-CM

## 2023-11-10 PROCEDURE — 73502 X-RAY EXAM HIP UNI 2-3 VIEWS: CPT | Mod: 26,RT

## 2023-11-10 PROCEDURE — 72125 CT NECK SPINE W/O DYE: CPT | Mod: MA

## 2023-11-10 PROCEDURE — 71045 X-RAY EXAM CHEST 1 VIEW: CPT

## 2023-11-10 PROCEDURE — 99284 EMERGENCY DEPT VISIT MOD MDM: CPT | Mod: 25

## 2023-11-10 PROCEDURE — 93010 ELECTROCARDIOGRAM REPORT: CPT

## 2023-11-10 PROCEDURE — 70450 CT HEAD/BRAIN W/O DYE: CPT | Mod: MA

## 2023-11-10 PROCEDURE — 72125 CT NECK SPINE W/O DYE: CPT | Mod: 26,MA

## 2023-11-10 PROCEDURE — 70450 CT HEAD/BRAIN W/O DYE: CPT | Mod: 26,MA

## 2023-11-10 PROCEDURE — 73502 X-RAY EXAM HIP UNI 2-3 VIEWS: CPT

## 2023-11-10 PROCEDURE — 93005 ELECTROCARDIOGRAM TRACING: CPT

## 2023-11-10 PROCEDURE — 72170 X-RAY EXAM OF PELVIS: CPT

## 2023-11-10 PROCEDURE — 71045 X-RAY EXAM CHEST 1 VIEW: CPT | Mod: 26

## 2023-11-10 PROCEDURE — 99285 EMERGENCY DEPT VISIT HI MDM: CPT

## 2023-11-10 RX ORDER — ACETAMINOPHEN 500 MG
650 TABLET ORAL ONCE
Refills: 0 | Status: COMPLETED | OUTPATIENT
Start: 2023-11-10 | End: 2023-11-10

## 2023-11-10 RX ORDER — BACITRACIN ZINC 500 UNIT/G
1 OINTMENT IN PACKET (EA) TOPICAL ONCE
Refills: 0 | Status: COMPLETED | OUTPATIENT
Start: 2023-11-10 | End: 2023-11-10

## 2023-11-10 RX ADMIN — Medication 1 APPLICATION(S): at 10:13

## 2023-11-10 RX ADMIN — Medication 650 MILLIGRAM(S): at 10:13

## 2023-11-10 NOTE — ED ADULT NURSE NOTE - NSFALLHARMRISKINTERV_ED_ALL_ED

## 2023-11-10 NOTE — ED PROVIDER NOTE - NSFOLLOWUPINSTRUCTIONS_ED_ALL_ED_FT
See your primary care doctor in 1 week.   Take tylenol as needed but not more than every 4 hours for pain    Fall Prevention for Older Adults    AMBULATORY CARE:    Fall prevention includes ways to make your home and other areas safer. It also includes ways you can move more carefully to prevent a fall. As you age, your muscles weaken and your risk for falls increases. Your risk also increases if you take medicines that make you sleepy or dizzy. You may also be at risk if you have vision or joint problems, have low blood pressure, or are not active.    Arrange to have someone call your local emergency number (911 in the ) if:    You have fallen and are found unconscious.    You have fallen and cannot move part of your body.  Call your doctor if:    You have fallen and have pain or a headache.    You have questions or concerns about your condition or care.  Fall prevention tips:    Stay active. Exercise can help strengthen your muscles and improve your balance. Your healthcare provider may recommend water aerobics, walking, or Akhil Chi. He or she may also recommend physical therapy to improve your coordination. Never start an exercise program without asking your healthcare provider first.  Water Aerobics for Seniors  Akhil Chi for Seniors      Wear shoes that fit well and have soles that . Wear shoes both inside and outside. Use slippers with good . Avoid shoes with high heels.    Use assistive devices as directed. Your healthcare provider may suggest that you use a cane or walker to help you keep your balance. You may need to have grab bars put in your bathroom near the toilet or in the shower.    Stand or sit up slowly. This may help you keep your balance and prevent falls.    Manage your medical conditions. Keep all appointments with your healthcare providers. Visit your eye doctor as directed.  Home safety tips:  Fall Prevention for Seniors    Add items to prevent falls in the bathroom. Put nonslip strips on your bath or shower floor to prevent you from slipping. Use a bath mat if you do not have carpet in the bathroom. This will prevent you from falling when you step out of the bath or shower. Use a shower seat so you do not need to stand while you shower. Sit on the toilet or a chair in your bathroom to dry yourself and put on clothing. This will prevent you from losing your balance from drying or dressing yourself while you are standing.    Keep paths clear. Remove books, shoes, and other objects from walkways and stairs. Place cords for telephones and lamps out of the way so that you do not need to walk over them. Tape them down if you cannot move them. Remove small rugs. If you cannot remove a rug, secure it with double-sided tape. This will prevent you from tripping.    Install bright lights in your home. Use night lights to help light paths to the bathroom or kitchen. Always turn on the light before you start walking.    Keep items you use often on shelves within reach. Do not use a step stool to help you reach an item.    Paint or place reflective tape on the edges of your stairs. This will help you see the stairs better.  Plan ahead in case you do fall: Talk with family members, friends, and neighbors to create a fall plan. Someone will need to call for emergency help if you are injured or found unconscious. If possible, keep a mobile phone with you at all times, or wear an emergency alert device. You can contact emergency services by pressing a button on the device. Ask your healthcare provider for more information.    Follow up with your doctor as directed: Write down your questions so you remember to ask them during your visits.

## 2023-11-10 NOTE — ED ADULT NURSE NOTE - OBJECTIVE STATEMENT
89 y/o male presents to ED s/p mechanical fall. pt states "I slipped" and fell on "thick carpet". pt was able to stand up independently. denies head strike or LOC. on eliquis 5 mg bid for a-fib (last dose this morning). 2 avulsion to back of right forearm (1 approx. 2cm, 2 approx. 10cm). no bleeding present. abrasion and + swelling to right hip. denies pain to site. ambulates with steady gait. +ROM. denies bladder or bowel incontinence. CM+ and  connected.

## 2023-11-10 NOTE — ED PROVIDER NOTE - WR INTERPRETATION 3
MSK XR negative - No fracture, No dislocation, No foreign bodyCXR negative - No pneumothorax, No opacities, No free airCXR negative - No infiltrates, No consolidation, No atelectasis seenCXR negative - No CHF, No cardiomegaly, No pleural effusionsCXR nega

## 2023-11-10 NOTE — ED PROVIDER NOTE - PROGRESS NOTE DETAILS
imaging neg for acute pathology on both my independent interpretation and radiologist ct reads.   wounds dressed with bacitracin and gauze  Reevaluated patient at bedside.  Patient feeling well. Discussed the results of all diagnostic testing in ED and copies of all reports given.   An opportunity to ask questions was given.  Discussed the importance of prompt, close medical follow-up.  Patient will return with any changes, concerns or persistent / worsening symptoms.  Understanding of all instructions verbalized. dc w pmd f/u  Based on the H&P, I do not suspect any life- / limb- threatening pathology that requires further intervention at this time. imaging neg for acute pathology on both my independent interpretation and radiologist ct reads.   wounds dressed with bacitracin and gauze and kerlix  Reevaluated patient at bedside.  Patient feeling well. Discussed the results of all diagnostic testing in ED and copies of all reports given.   An opportunity to ask questions was given.  Discussed the importance of prompt, close medical follow-up.  Patient will return with any changes, concerns or persistent / worsening symptoms.  Understanding of all instructions verbalized. dc w pmd f/u  Based on the H&P, I do not suspect any life- / limb- threatening pathology that requires further intervention at this time.

## 2023-11-10 NOTE — ED PROVIDER NOTE - OBJECTIVE STATEMENT
89 yo M PMH afib on eliquis 5mg BID p/w mechanical fall this morning down 8 steps. denies head trauma, loc. ambulatory on scene, got up himself, c/o pain to right hip.

## 2023-11-10 NOTE — ED PROVIDER NOTE - PATIENT PORTAL LINK FT
You can access the FollowMyHealth Patient Portal offered by [facility] by registering at the following website: [email or web address]. By joining [employer]’s FollowMyHealth portal, you will also be able to view your health information using other applications (apps) compatible with our system.

## 2023-11-10 NOTE — ED PROVIDER NOTE - CARE PLAN
Principal Discharge DX:	Multiple skin tears  Secondary Diagnosis:	Musculoskeletal pain  Secondary Diagnosis:	Fall  Secondary Diagnosis:	Hematoma   1

## 2023-11-10 NOTE — ED ADULT TRIAGE NOTE - CHIEF COMPLAINT QUOTE
patient with fall down stairs on Eliquis did not hit head no LOC. right arm skin avulsion Approx. 10cm, no bleeding. also c/o pain and avulsion to right hip. took his morning lopressor within the hour. patient with mechanical fall down stairs on Eliquis did not hit head no LOC. right arm skin avulsion Approx. 10cm, no bleeding. also c/o pain and avulsion to right hip. took his morning lopressor within the hour. patient with mechanical fall down stairs on Eliquis did not hit head no LOC. right arm skin avulsion Approx. 10cm, no bleeding. also c/o pain and avulsion to right hip. took his morning lopressor within the hour. ambulatory without difficulty. patient with mechanical fall down stairs on Eliquis did not hit head no LOC. right arm skin avulsion Approx. 10cm, no bleeding. also mild pain with skin avulsion to right hip. took his morning lopressor within the hour. ambulatory without difficulty.

## 2023-11-10 NOTE — ED ADULT NURSE NOTE - CHIEF COMPLAINT QUOTE
patient with mechanical fall down stairs on Eliquis did not hit head no LOC. right arm skin avulsion Approx. 10cm, no bleeding. also mild pain with skin avulsion to right hip. took his morning lopressor within the hour. ambulatory without difficulty.

## 2023-11-10 NOTE — ED PROVIDER NOTE - PHYSICAL EXAMINATION
Constitutional: Awake, Alert. NAD. Well appearing, well nourished. Cooperative. VSS.  HEAD: NC/AT; no signs of trauma   EYES: Conjunctiva and sclera are clear bilaterally. EOMI. No nystagmus. PERRL.  ENT: MMM. No rhinorrhea, normal pharynx, oropharynx patent, no tonsillar exudates or enlargement, no erythema, no drooling or stridor.   NECK: FROM. Supple. No nuchal rigidity. No midline or paraspinal TTP.  CARDIOVASCULAR: irregular, tachycardic at triage but 88 on ekg. No chest wall ttp. Radial pulses +2 B/L.  RESPIRATORY: Speaking full sentences. Normal respiratory effort;   ABDOMEN: Soft; NTND. No CVAT B/L. +BS x4 quadrants.  EXTREMITIES: Full active ROM in all extremities; no deformities; non-tender to palpation; no crepitus or step off;  distal pulses palpable and symmetric.   SKIN: Warm, dry; good skin turgor, +hematoma to right lateral hip area near greater troch, nontender with overlying skin tear. +skin tear to right forearm V shaped, brisk capillary refill.  NEURO: AAOx3 follows commands. No facial droop. No truncal ataxia. Steady gait. Moving all extrem spont

## 2023-11-10 NOTE — ED PROVIDER NOTE - CLINICAL SUMMARY MEDICAL DECISION MAKING FREE TEXT BOX
89 yo M PMH afib on eliquis p/w mechanical fall, denies head trauma or loc, ambualtory on scene  vss  FROM x 4 no deformities  +hematoma to right lateral hip area w skin tear  skin tear to right forearm  neck FROM without pain, no midline ttp or stepoffs    consider ich, c spine injury (pt fails [place] c spine rules; nexus 0). low susp bony fx, compartment syndrome.   will get ekg, ct head c spine, cxr, pelvis xr, tylenol, reassess

## 2023-11-21 ENCOUNTER — APPOINTMENT (OUTPATIENT)
Dept: ORTHOPEDIC SURGERY | Facility: CLINIC | Age: 88
End: 2023-11-21
Payer: MEDICARE

## 2023-11-21 VITALS
DIASTOLIC BLOOD PRESSURE: 67 MMHG | SYSTOLIC BLOOD PRESSURE: 96 MMHG | BODY MASS INDEX: 24.4 KG/M2 | HEIGHT: 68 IN | WEIGHT: 161 LBS | HEART RATE: 85 BPM

## 2023-11-21 DIAGNOSIS — S70.01XA CONTUSION OF RIGHT HIP, INITIAL ENCOUNTER: ICD-10-CM

## 2023-11-21 DIAGNOSIS — M25.551 PAIN IN RIGHT HIP: ICD-10-CM

## 2023-11-21 PROCEDURE — 99203 OFFICE O/P NEW LOW 30 MIN: CPT

## 2023-11-21 PROCEDURE — 73502 X-RAY EXAM HIP UNI 2-3 VIEWS: CPT

## 2023-11-29 ENCOUNTER — NON-APPOINTMENT (OUTPATIENT)
Age: 88
End: 2023-11-29

## 2024-05-29 ENCOUNTER — APPOINTMENT (OUTPATIENT)
Dept: ORTHOPEDIC SURGERY | Facility: CLINIC | Age: 89
End: 2024-05-29

## 2024-05-30 ENCOUNTER — APPOINTMENT (OUTPATIENT)
Dept: ORTHOPEDIC SURGERY | Facility: CLINIC | Age: 89
End: 2024-05-30
Payer: MEDICARE

## 2024-05-30 VITALS
HEIGHT: 68 IN | BODY MASS INDEX: 24.4 KG/M2 | HEART RATE: 117 BPM | DIASTOLIC BLOOD PRESSURE: 93 MMHG | SYSTOLIC BLOOD PRESSURE: 134 MMHG | WEIGHT: 161 LBS

## 2024-05-30 DIAGNOSIS — R22.30 LOCALIZED SWELLING, MASS AND LUMP, UNSPECIFIED UPPER LIMB: ICD-10-CM

## 2024-05-30 DIAGNOSIS — M70.21 OLECRANON BURSITIS, RIGHT ELBOW: ICD-10-CM

## 2024-05-30 DIAGNOSIS — M25.521 PAIN IN RIGHT ELBOW: ICD-10-CM

## 2024-05-30 PROCEDURE — 99215 OFFICE O/P EST HI 40 MIN: CPT | Mod: 25

## 2024-05-30 PROCEDURE — 20612 ASPIRATE/INJ GANGLION CYST: CPT | Mod: RT

## 2024-05-30 PROCEDURE — 73080 X-RAY EXAM OF ELBOW: CPT | Mod: RT

## 2024-05-30 NOTE — PHYSICAL EXAM
[de-identified] : Examination of the right elbow reveals a posterior mass.  No signs of infection and he tolerates full active and passive motion [de-identified] : 3 views of [right] elbow were obtained today in my office and were seen by me and discussed with the patient.  These are posterior olecranon traction spur

## 2024-05-30 NOTE — ASSESSMENT
[FreeTextEntry1] : ASSESSMENT: The patient comes in today with history of right elbow discomfort and now with a posterior mass we have discussed olecranon bursitis and the risk of recurrence as well as etiologies of this condition.  With this in mind the patient would like to proceed with aspiration of the mass.   The patient was adequately and thoroughly informed of my assessment of their current condition(s).  - This may diminish bodily function for the extremity. We discussed prognosis, tx modalities including operative and nonoperative options for the above diagnostic assessment. As always, 2nd opinion is always provided as an option.  When accessible, I was able to review other physicians note(s) including reviewing other tests, imaging results as well as personally view these results for my own interpretation.   Aspiration Consent [right elbow Ganglion]  The risks and benefits of a mass aspiration were discussed in detail.  The risks include but are not limited to: pain, infection, swelling, flare response, bleeding.  The risk of incomplete resolution of symptoms, recurrence and additional intervention was reviewed and considered by the patient.  The patient agreed to proceed and under a sterile prep, I aspirated several cc The patient tolerated the injection well. The patient was adequately and thoroughly informed of my assessment of their current condition(s).  DISCUSSION: 1.  Aspiration as above.  Activity modification 2. [x] 3. [x]

## 2024-05-30 NOTE — HISTORY OF PRESENT ILLNESS
[FreeTextEntry1] : Francisco is a very pleasant 89-year-old male who presents today with a history of right elbow discomfort and complaints of posterior mass.  Denies injury

## 2024-07-19 ENCOUNTER — APPOINTMENT (OUTPATIENT)
Dept: ORTHOPEDIC SURGERY | Facility: CLINIC | Age: 89
End: 2024-07-19
Payer: MEDICARE

## 2024-07-19 VITALS
WEIGHT: 161 LBS | BODY MASS INDEX: 24.4 KG/M2 | SYSTOLIC BLOOD PRESSURE: 117 MMHG | DIASTOLIC BLOOD PRESSURE: 89 MMHG | HEIGHT: 68 IN

## 2024-07-19 DIAGNOSIS — R22.30 LOCALIZED SWELLING, MASS AND LUMP, UNSPECIFIED UPPER LIMB: ICD-10-CM

## 2024-07-19 DIAGNOSIS — M25.521 PAIN IN RIGHT ELBOW: ICD-10-CM

## 2024-07-19 DIAGNOSIS — S50.00XA CONTUSION OF UNSPECIFIED ELBOW, INITIAL ENCOUNTER: ICD-10-CM

## 2024-07-19 PROCEDURE — 99214 OFFICE O/P EST MOD 30 MIN: CPT | Mod: 25

## 2024-10-21 ENCOUNTER — APPOINTMENT (OUTPATIENT)
Dept: UROLOGY | Facility: CLINIC | Age: 89
End: 2024-10-21
Payer: MEDICARE

## 2024-10-21 VITALS
DIASTOLIC BLOOD PRESSURE: 85 MMHG | WEIGHT: 161 LBS | BODY MASS INDEX: 24.4 KG/M2 | SYSTOLIC BLOOD PRESSURE: 125 MMHG | HEART RATE: 144 BPM | HEIGHT: 68 IN

## 2024-10-21 DIAGNOSIS — R82.998 OTHER ABNORMAL FINDINGS IN URINE: ICD-10-CM

## 2024-10-21 PROCEDURE — 99203 OFFICE O/P NEW LOW 30 MIN: CPT

## 2024-10-23 LAB
APPEARANCE: CLEAR
BACTERIA: NEGATIVE /HPF
BILIRUBIN URINE: NEGATIVE
BLOOD URINE: NEGATIVE
CAST: 0 /LPF
COLOR: YELLOW
EPITHELIAL CELLS: 1 /HPF
GLUCOSE QUALITATIVE U: NEGATIVE MG/DL
KETONES URINE: NEGATIVE MG/DL
LEUKOCYTE ESTERASE URINE: ABNORMAL
MICROSCOPIC-UA: NORMAL
NITRITE URINE: NEGATIVE
PH URINE: 5.5
PROTEIN URINE: NEGATIVE MG/DL
RED BLOOD CELLS URINE: 1 /HPF
SPECIFIC GRAVITY URINE: 1.02
UROBILINOGEN URINE: 0.2 MG/DL
WHITE BLOOD CELLS URINE: 0 /HPF

## 2024-10-25 LAB — BACTERIA UR CULT: NORMAL

## 2024-10-29 DIAGNOSIS — M25.561 PAIN IN RIGHT KNEE: ICD-10-CM

## 2024-11-05 ENCOUNTER — APPOINTMENT (OUTPATIENT)
Dept: ORTHOPEDIC SURGERY | Facility: CLINIC | Age: 89
End: 2024-11-05

## 2024-11-05 VITALS
HEIGHT: 68 IN | BODY MASS INDEX: 24.4 KG/M2 | SYSTOLIC BLOOD PRESSURE: 120 MMHG | DIASTOLIC BLOOD PRESSURE: 83 MMHG | HEART RATE: 130 BPM | WEIGHT: 161 LBS

## 2025-01-09 ENCOUNTER — APPOINTMENT (OUTPATIENT)
Dept: UROLOGY | Facility: CLINIC | Age: 89
End: 2025-01-09

## 2025-04-27 ENCOUNTER — NON-APPOINTMENT (OUTPATIENT)
Age: 89
End: 2025-04-27

## 2025-04-27 RX ORDER — CEFUROXIME SODIUM 1.5 G
1 VIAL (EA) INJECTION
Refills: 0 | DISCHARGE
Start: 2025-04-27

## 2025-04-27 RX ORDER — DOXYCYCLINE HYCLATE 100 MG
1 TABLET ORAL
Refills: 0 | DISCHARGE
Start: 2025-04-27

## 2025-04-29 ENCOUNTER — RESULT REVIEW (OUTPATIENT)
Age: 89
End: 2025-04-29

## 2025-04-29 ENCOUNTER — INPATIENT (INPATIENT)
Facility: HOSPITAL | Age: 89
LOS: 2 days | Discharge: NURSING, REHAB OR LONG-TERM CARE | DRG: 310 | End: 2025-05-02
Attending: HOSPITALIST | Admitting: HOSPITALIST
Payer: MEDICARE

## 2025-04-29 VITALS
SYSTOLIC BLOOD PRESSURE: 155 MMHG | TEMPERATURE: 98 F | RESPIRATION RATE: 26 BRPM | OXYGEN SATURATION: 93 % | HEART RATE: 158 BPM | WEIGHT: 162.92 LBS | DIASTOLIC BLOOD PRESSURE: 106 MMHG

## 2025-04-29 DIAGNOSIS — Z98.890 OTHER SPECIFIED POSTPROCEDURAL STATES: Chronic | ICD-10-CM

## 2025-04-29 DIAGNOSIS — Z90.89 ACQUIRED ABSENCE OF OTHER ORGANS: Chronic | ICD-10-CM

## 2025-04-29 DIAGNOSIS — I48.91 UNSPECIFIED ATRIAL FIBRILLATION: ICD-10-CM

## 2025-04-29 DIAGNOSIS — Z90.49 ACQUIRED ABSENCE OF OTHER SPECIFIED PARTS OF DIGESTIVE TRACT: Chronic | ICD-10-CM

## 2025-04-29 LAB
A1C WITH ESTIMATED AVERAGE GLUCOSE RESULT: 6.2 % — HIGH (ref 4–5.6)
ALBUMIN SERPL ELPH-MCNC: 4 G/DL — SIGNIFICANT CHANGE UP (ref 3.3–5.2)
ALP SERPL-CCNC: 99 U/L — SIGNIFICANT CHANGE UP (ref 40–120)
ALT FLD-CCNC: 15 U/L — SIGNIFICANT CHANGE UP
ANION GAP SERPL CALC-SCNC: 14 MMOL/L — SIGNIFICANT CHANGE UP (ref 5–17)
ANION GAP SERPL CALC-SCNC: 17 MMOL/L — SIGNIFICANT CHANGE UP (ref 5–17)
APPEARANCE UR: CLEAR — SIGNIFICANT CHANGE UP
APTT BLD: 36.4 SEC — SIGNIFICANT CHANGE UP (ref 26.1–36.8)
AST SERPL-CCNC: 22 U/L — SIGNIFICANT CHANGE UP
BACTERIA # UR AUTO: NEGATIVE /HPF — SIGNIFICANT CHANGE UP
BASOPHILS # BLD AUTO: 0.02 K/UL — SIGNIFICANT CHANGE UP (ref 0–0.2)
BASOPHILS NFR BLD AUTO: 0.3 % — SIGNIFICANT CHANGE UP (ref 0–2)
BILIRUB SERPL-MCNC: 0.5 MG/DL — SIGNIFICANT CHANGE UP (ref 0.4–2)
BILIRUB UR-MCNC: NEGATIVE — SIGNIFICANT CHANGE UP
BUN SERPL-MCNC: 19.9 MG/DL — SIGNIFICANT CHANGE UP (ref 8–20)
BUN SERPL-MCNC: 20.5 MG/DL — HIGH (ref 8–20)
CALCIUM SERPL-MCNC: 8.7 MG/DL — SIGNIFICANT CHANGE UP (ref 8.4–10.5)
CALCIUM SERPL-MCNC: 8.9 MG/DL — SIGNIFICANT CHANGE UP (ref 8.4–10.5)
CAST: 0 /LPF — SIGNIFICANT CHANGE UP (ref 0–4)
CHLORIDE SERPL-SCNC: 101 MMOL/L — SIGNIFICANT CHANGE UP (ref 96–108)
CHLORIDE SERPL-SCNC: 99 MMOL/L — SIGNIFICANT CHANGE UP (ref 96–108)
CHOLEST SERPL-MCNC: 167 MG/DL — SIGNIFICANT CHANGE UP
CO2 SERPL-SCNC: 23 MMOL/L — SIGNIFICANT CHANGE UP (ref 22–29)
CO2 SERPL-SCNC: 25 MMOL/L — SIGNIFICANT CHANGE UP (ref 22–29)
COLOR SPEC: YELLOW — SIGNIFICANT CHANGE UP
CREAT SERPL-MCNC: 1.08 MG/DL — SIGNIFICANT CHANGE UP (ref 0.5–1.3)
CREAT SERPL-MCNC: 1.14 MG/DL — SIGNIFICANT CHANGE UP (ref 0.5–1.3)
DIFF PNL FLD: ABNORMAL
EGFR: 59 ML/MIN/1.73M2 — LOW
EGFR: 59 ML/MIN/1.73M2 — LOW
EGFR: 63 ML/MIN/1.73M2 — SIGNIFICANT CHANGE UP
EGFR: 63 ML/MIN/1.73M2 — SIGNIFICANT CHANGE UP
EOSINOPHIL # BLD AUTO: 0.16 K/UL — SIGNIFICANT CHANGE UP (ref 0–0.5)
EOSINOPHIL NFR BLD AUTO: 2.1 % — SIGNIFICANT CHANGE UP (ref 0–6)
ESTIMATED AVERAGE GLUCOSE: 131 MG/DL — HIGH (ref 68–114)
GLUCOSE SERPL-MCNC: 98 MG/DL — SIGNIFICANT CHANGE UP (ref 70–99)
GLUCOSE SERPL-MCNC: 98 MG/DL — SIGNIFICANT CHANGE UP (ref 70–99)
GLUCOSE UR QL: NEGATIVE MG/DL — SIGNIFICANT CHANGE UP
HCT VFR BLD CALC: 46.3 % — SIGNIFICANT CHANGE UP (ref 39–50)
HDLC SERPL-MCNC: 33 MG/DL — LOW
HGB BLD-MCNC: 15 G/DL — SIGNIFICANT CHANGE UP (ref 13–17)
HMPV RNA SPEC QL NAA+PROBE: DETECTED
IMM GRANULOCYTES # BLD AUTO: 0.02 K/UL — SIGNIFICANT CHANGE UP (ref 0–0.07)
IMM GRANULOCYTES NFR BLD AUTO: 0.3 % — SIGNIFICANT CHANGE UP (ref 0–0.9)
INR BLD: 1.5 RATIO — HIGH (ref 0.85–1.16)
KETONES UR-MCNC: NEGATIVE MG/DL — SIGNIFICANT CHANGE UP
LDLC SERPL-MCNC: 119 MG/DL — HIGH
LEUKOCYTE ESTERASE UR-ACNC: NEGATIVE — SIGNIFICANT CHANGE UP
LIPID PNL WITH DIRECT LDL SERPL: 119 MG/DL — HIGH
LYMPHOCYTES # BLD AUTO: 1.35 K/UL — SIGNIFICANT CHANGE UP (ref 1–3.3)
LYMPHOCYTES NFR BLD AUTO: 17.4 % — SIGNIFICANT CHANGE UP (ref 13–44)
MAGNESIUM SERPL-MCNC: 1.9 MG/DL — SIGNIFICANT CHANGE UP (ref 1.6–2.6)
MCHC RBC-ENTMCNC: 31.6 PG — SIGNIFICANT CHANGE UP (ref 27–34)
MCHC RBC-ENTMCNC: 32.4 G/DL — SIGNIFICANT CHANGE UP (ref 32–36)
MCV RBC AUTO: 97.7 FL — SIGNIFICANT CHANGE UP (ref 80–100)
MONOCYTES # BLD AUTO: 0.68 K/UL — SIGNIFICANT CHANGE UP (ref 0–0.9)
MONOCYTES NFR BLD AUTO: 8.8 % — SIGNIFICANT CHANGE UP (ref 2–14)
MRSA PCR RESULT.: SIGNIFICANT CHANGE UP
NEUTROPHILS # BLD AUTO: 5.51 K/UL — SIGNIFICANT CHANGE UP (ref 1.8–7.4)
NEUTROPHILS NFR BLD AUTO: 71.1 % — SIGNIFICANT CHANGE UP (ref 43–77)
NITRITE UR-MCNC: NEGATIVE — SIGNIFICANT CHANGE UP
NONHDLC SERPL-MCNC: 134 MG/DL — HIGH
NRBC # BLD AUTO: 0 K/UL — SIGNIFICANT CHANGE UP (ref 0–0)
NRBC # FLD: 0 K/UL — SIGNIFICANT CHANGE UP (ref 0–0)
NRBC BLD AUTO-RTO: 0 /100 WBCS — SIGNIFICANT CHANGE UP (ref 0–0)
NT-PROBNP SERPL-SCNC: 1994 PG/ML — HIGH (ref 0–300)
PH UR: 6.5 — SIGNIFICANT CHANGE UP (ref 5–8)
PHOSPHATE SERPL-MCNC: 2.8 MG/DL — SIGNIFICANT CHANGE UP (ref 2.4–4.7)
PLATELET # BLD AUTO: 233 K/UL — SIGNIFICANT CHANGE UP (ref 150–400)
PMV BLD: 10 FL — SIGNIFICANT CHANGE UP (ref 7–13)
POTASSIUM SERPL-MCNC: 4.1 MMOL/L — SIGNIFICANT CHANGE UP (ref 3.5–5.3)
POTASSIUM SERPL-MCNC: 4.3 MMOL/L — SIGNIFICANT CHANGE UP (ref 3.5–5.3)
POTASSIUM SERPL-SCNC: 4.1 MMOL/L — SIGNIFICANT CHANGE UP (ref 3.5–5.3)
POTASSIUM SERPL-SCNC: 4.3 MMOL/L — SIGNIFICANT CHANGE UP (ref 3.5–5.3)
PROCALCITONIN SERPL-MCNC: 0.12 NG/ML — HIGH (ref 0.02–0.1)
PROT SERPL-MCNC: 7.2 G/DL — SIGNIFICANT CHANGE UP (ref 6.6–8.7)
PROT UR-MCNC: NEGATIVE MG/DL — SIGNIFICANT CHANGE UP
PROTHROM AB SERPL-ACNC: 17.3 SEC — HIGH (ref 9.9–13.4)
RAPID RVP RESULT: DETECTED
RBC # BLD: 4.74 M/UL — SIGNIFICANT CHANGE UP (ref 4.2–5.8)
RBC # FLD: 13.4 % — SIGNIFICANT CHANGE UP (ref 10.3–14.5)
RBC CASTS # UR COMP ASSIST: 22 /HPF — HIGH (ref 0–4)
S AUREUS DNA NOSE QL NAA+PROBE: SIGNIFICANT CHANGE UP
SARS-COV-2 RNA SPEC QL NAA+PROBE: SIGNIFICANT CHANGE UP
SODIUM SERPL-SCNC: 139 MMOL/L — SIGNIFICANT CHANGE UP (ref 135–145)
SODIUM SERPL-SCNC: 140 MMOL/L — SIGNIFICANT CHANGE UP (ref 135–145)
SP GR SPEC: 1.01 — SIGNIFICANT CHANGE UP (ref 1–1.03)
SQUAMOUS # UR AUTO: 0 /HPF — SIGNIFICANT CHANGE UP (ref 0–5)
TRIGL SERPL-MCNC: 81 MG/DL — SIGNIFICANT CHANGE UP
TROPONIN T, HIGH SENSITIVITY RESULT: 23 NG/L — SIGNIFICANT CHANGE UP (ref 0–51)
TROPONIN T, HIGH SENSITIVITY RESULT: 23 NG/L — SIGNIFICANT CHANGE UP (ref 0–51)
TSH SERPL-MCNC: 3.15 UIU/ML — SIGNIFICANT CHANGE UP (ref 0.27–4.2)
UROBILINOGEN FLD QL: 0.2 MG/DL — SIGNIFICANT CHANGE UP (ref 0.2–1)
WBC # BLD: 7.74 K/UL — SIGNIFICANT CHANGE UP (ref 3.8–10.5)
WBC # FLD AUTO: 7.74 K/UL — SIGNIFICANT CHANGE UP (ref 3.8–10.5)
WBC UR QL: 0 /HPF — SIGNIFICANT CHANGE UP (ref 0–5)

## 2025-04-29 PROCEDURE — 99223 1ST HOSP IP/OBS HIGH 75: CPT

## 2025-04-29 PROCEDURE — 93010 ELECTROCARDIOGRAM REPORT: CPT

## 2025-04-29 PROCEDURE — 99285 EMERGENCY DEPT VISIT HI MDM: CPT | Mod: GC

## 2025-04-29 PROCEDURE — 71045 X-RAY EXAM CHEST 1 VIEW: CPT | Mod: 26

## 2025-04-29 PROCEDURE — 93306 TTE W/DOPPLER COMPLETE: CPT | Mod: 26

## 2025-04-29 RX ORDER — APIXABAN 2.5 MG/1
5 TABLET, FILM COATED ORAL
Refills: 0 | Status: DISCONTINUED | OUTPATIENT
Start: 2025-04-29 | End: 2025-05-02

## 2025-04-29 RX ORDER — DILTIAZEM HYDROCHLORIDE 120 MG/1
15 CAPSULE, EXTENDED RELEASE ORAL ONCE
Refills: 0 | Status: COMPLETED | OUTPATIENT
Start: 2025-04-29 | End: 2025-04-29

## 2025-04-29 RX ORDER — DILTIAZEM HYDROCHLORIDE 120 MG/1
10 CAPSULE, EXTENDED RELEASE ORAL ONCE
Refills: 0 | Status: DISCONTINUED | OUTPATIENT
Start: 2025-04-29 | End: 2025-04-29

## 2025-04-29 RX ORDER — DILTIAZEM HYDROCHLORIDE 120 MG/1
10 CAPSULE, EXTENDED RELEASE ORAL ONCE
Refills: 0 | Status: COMPLETED | OUTPATIENT
Start: 2025-04-29 | End: 2025-04-29

## 2025-04-29 RX ORDER — FUROSEMIDE 10 MG/ML
40 INJECTION INTRAMUSCULAR; INTRAVENOUS DAILY
Refills: 0 | Status: DISCONTINUED | OUTPATIENT
Start: 2025-04-29 | End: 2025-04-30

## 2025-04-29 RX ORDER — METOPROLOL SUCCINATE 50 MG/1
25 TABLET, EXTENDED RELEASE ORAL AT BEDTIME
Refills: 0 | Status: DISCONTINUED | OUTPATIENT
Start: 2025-04-29 | End: 2025-05-02

## 2025-04-29 RX ORDER — DILTIAZEM HYDROCHLORIDE 120 MG/1
60 CAPSULE, EXTENDED RELEASE ORAL ONCE
Refills: 0 | Status: COMPLETED | OUTPATIENT
Start: 2025-04-29 | End: 2025-04-29

## 2025-04-29 RX ORDER — METOPROLOL SUCCINATE 50 MG/1
100 TABLET, EXTENDED RELEASE ORAL DAILY
Refills: 0 | Status: DISCONTINUED | OUTPATIENT
Start: 2025-04-29 | End: 2025-05-02

## 2025-04-29 RX ORDER — ATORVASTATIN CALCIUM 80 MG/1
20 TABLET, FILM COATED ORAL AT BEDTIME
Refills: 0 | Status: DISCONTINUED | OUTPATIENT
Start: 2025-04-29 | End: 2025-05-02

## 2025-04-29 RX ORDER — FUROSEMIDE 10 MG/ML
20 INJECTION INTRAMUSCULAR; INTRAVENOUS ONCE
Refills: 0 | Status: COMPLETED | OUTPATIENT
Start: 2025-04-29 | End: 2025-04-29

## 2025-04-29 RX ORDER — FUROSEMIDE 10 MG/ML
20 INJECTION INTRAMUSCULAR; INTRAVENOUS
Refills: 0 | Status: DISCONTINUED | OUTPATIENT
Start: 2025-04-29 | End: 2025-04-29

## 2025-04-29 RX ORDER — DILTIAZEM HYDROCHLORIDE 120 MG/1
30 CAPSULE, EXTENDED RELEASE ORAL
Refills: 0 | Status: DISCONTINUED | OUTPATIENT
Start: 2025-04-29 | End: 2025-04-30

## 2025-04-29 RX ADMIN — ATORVASTATIN CALCIUM 20 MILLIGRAM(S): 80 TABLET, FILM COATED ORAL at 21:21

## 2025-04-29 RX ADMIN — FUROSEMIDE 20 MILLIGRAM(S): 10 INJECTION INTRAMUSCULAR; INTRAVENOUS at 05:17

## 2025-04-29 RX ADMIN — APIXABAN 5 MILLIGRAM(S): 2.5 TABLET, FILM COATED ORAL at 17:57

## 2025-04-29 RX ADMIN — DILTIAZEM HYDROCHLORIDE 15 MILLIGRAM(S): 120 CAPSULE, EXTENDED RELEASE ORAL at 05:42

## 2025-04-29 RX ADMIN — DILTIAZEM HYDROCHLORIDE 30 MILLIGRAM(S): 120 CAPSULE, EXTENDED RELEASE ORAL at 12:15

## 2025-04-29 RX ADMIN — DILTIAZEM HYDROCHLORIDE 10 MILLIGRAM(S): 120 CAPSULE, EXTENDED RELEASE ORAL at 05:08

## 2025-04-29 RX ADMIN — METOPROLOL SUCCINATE 25 MILLIGRAM(S): 50 TABLET, EXTENDED RELEASE ORAL at 21:21

## 2025-04-29 RX ADMIN — DILTIAZEM HYDROCHLORIDE 30 MILLIGRAM(S): 120 CAPSULE, EXTENDED RELEASE ORAL at 23:39

## 2025-04-29 RX ADMIN — DILTIAZEM HYDROCHLORIDE 60 MILLIGRAM(S): 120 CAPSULE, EXTENDED RELEASE ORAL at 06:09

## 2025-04-29 RX ADMIN — DILTIAZEM HYDROCHLORIDE 30 MILLIGRAM(S): 120 CAPSULE, EXTENDED RELEASE ORAL at 17:57

## 2025-04-29 NOTE — ED ADULT NURSE NOTE - TEMPLATE
Mildly to Moderately Impaired: difficulty hearing in some environments or speaker may need to increase volume or speak distinctly General

## 2025-04-29 NOTE — H&P ADULT - HISTORY OF PRESENT ILLNESS
89 y/o M w/ PMH of AF (on eliquis), HTN, HLD presented from [facility] c/o palpitations that started over night while at rest.  Pt states that he felt his heart was coming out of his chest so he came to hospital.  He also reports both of his legs have been swelling for the past 2 months.  He denies sob/hebert, orthopnea, CP, palpitations, abd pain, N/V, diarrhea.  He also denies recent cold/sick contacts, recent travel, fevers, chills, dysuria.

## 2025-04-29 NOTE — CONSULT NOTE ADULT - ASSESSMENT
89 y/o M w/ PMH of CAD,  Atrial Flutter s/p CV (on eliquis), HTN, HLD presented from [facility] c/o palpitations that started over night while at rest.  Pt states that he felt his heart was coming out of his chest so he came to hospital.  He also reports both of his legs have been swelling for the past 2 months.  He denies sob/hebert, orthopnea, CP, palpitations, abd pain, N/V, diarrhea.  He also denies recent cold/sick contacts, recent travel, fevers, chills, dysuria.  VS in the ED significant for tachycardia/AF w/ rates in 150's now in low 100's and RR in high 20's now nL and satting well ORA.  Clinically nontoxic appearing but has irregularly irregular rate/rhythm, diffuse bibasilar rales and 1+ pretibial/pedal pitting edema.  Initial w/u significant for trop 23 x1, BNP 1994, EKG w/ AF rvr but no acute ischemic changes appreciated. CXR w/ mild pulm vascular congestion appreciate and bibasilar atelectatics R>L appreciated on wet read.  s/p 60mg dilt po, total of 35mg IV dilt and 20mg IV lasix in ED.  Will admit for AF RVR and possible new onset HF.  89 y/o M w/ PMH of CAD,  Atrial Flutter s/p CV (on eliquis), HTN, HLD presented from [facility] c/o palpitations that started over night while at rest.  Pt states that he felt his heart was coming out of his chest so he came to hospital.  He also reports both of his legs have been swelling for the past 2 months.  He denies sob/hebert, orthopnea, CP, palpitations, abd pain, N/V, diarrhea.  He also denies recent cold/sick contacts, recent travel, fevers, chills, dysuria.  VS in the ED significant for tachycardia/AF w/ rates in 150's now in low 100's and RR in high 20's now nL and satting well ORA.  Clinically nontoxic appearing but has irregularly irregular rate/rhythm, diffuse bibasilar rales and 1+ pretibial/pedal pitting edema.  Initial w/u significant for trop 23 x1, BNP 1994, EKG w/ AF rvr but no acute ischemic changes appreciated. CXR w/ mild pulm vascular congestion appreciate and bibasilar atelectatics R>L appreciated on wet read.  s/p 60mg dilt po, total of 35mg IV dilt and 20mg IV lasix in ED.  Will admit for AF RVR and possible new onset HF.      Afib rate controlled   still vol up     Plan   lasix 40mg IV qd, monitor renal function and electrolytes   continue with CCB and BB   echocardiogram   continue telemetry

## 2025-04-29 NOTE — H&P ADULT - NSHPLABSRESULTS_GEN_ALL_CORE
15.0   7.74  )-----------( 233      ( 29 Apr 2025 04:55 )             46.3       04-29    140  |  101  |  20.5[H]  ----------------------------<  98  4.3   |  25.0  |  1.14    Ca    8.7      29 Apr 2025 04:55    TPro  7.2  /  Alb  4.0  /  TBili  0.5  /  DBili  x   /  AST  22  /  ALT  15  /  AlkPhos  99  04-29

## 2025-04-29 NOTE — PATIENT PROFILE ADULT - FALL HARM RISK - HARM RISK INTERVENTIONS

## 2025-04-29 NOTE — ED ADULT NURSE REASSESSMENT NOTE - NS ED NURSE REASSESS COMMENT FT1
Pt care assumed @ this time. Pt resting in bed. VSS. Currently in Afib on mon at 94. Resp even and unlabored. Awaiting HR bed at this time. Pt updated on plan of care and verbalizes understanding.

## 2025-04-29 NOTE — ED ADULT NURSE NOTE - NSFALLHARMRISKINTERV_ED_ALL_ED
Assistance OOB with selected safe patient handling equipment if applicable/Assistance with ambulation/Communicate risk of Fall with Harm to all staff, patient, and family/Monitor gait and stability/Provide patient with walking aids/Provide visual cue: red socks, yellow wristband, yellow gown, etc/Reinforce activity limits and safety measures with patient and family/Use of alarms - bed, stretcher, chair and/or video monitoring/Bed in lowest position, wheels locked, appropriate side rails in place/Call bell, personal items and telephone in reach/Instruct patient to call for assistance before getting out of bed/chair/stretcher/Non-slip footwear applied when patient is off stretcher/[place] to call system/Physically safe environment - no spills, clutter or unnecessary equipment/Purposeful Proactive Rounding/Room/bathroom lighting operational, light cord in reach

## 2025-04-29 NOTE — ED PROVIDER NOTE - PHYSICAL EXAMINATION
Gen: appears uncomfortable  Head: NC/AT  Neck: trachea midline  Card: irregularly irregular  Resp:  CTAB  Abd: soft, non-distended, non-tender  Ext: no deformities  Neuro: A&Ox3, LEWIS spontaneously, sensation intact and symmetrical b/l extremities, no facial droop, no slurred speech, EOMI  Skin:  Warm and dry as visualized  Psych:  Normal affect and mood Gen: appears uncomfortable  Head: NC/AT  Neck: trachea midline  Card: irregularly irregular  Resp:  rales b/l bases  Abd: soft, non-distended, non-tender  Ext: 1+ pitting edema b/l LE  Neuro: A&Ox3, LEWIS spontaneously, sensation intact and symmetrical b/l extremities, no facial droop, no slurred speech, EOMI  Skin:  Warm and dry as visualized  Psych:  Normal affect and mood

## 2025-04-29 NOTE — ED PROVIDER NOTE - OBJECTIVE STATEMENT
89 y/o M pt w/ PMHx of afib on eliquis presenting w/ SOB. went to  two days ago dx bronchitis given albuterol which hed never taken before. tonight worse SOB w/ palpitations. Afib w/ RVR on EKG. He denies chest pain, back pain, numbness, tingling, focal weakness, fever, chills, or any other complaints.

## 2025-04-29 NOTE — ED ADULT NURSE REASSESSMENT NOTE - NS ED NURSE REASSESS COMMENT FT1
Received pt from Kristyn BOB RN in the main ED from the critical care area. Pt reportedly arrived to the ED short of breath and was found to be in rapid afib. Pt is currently admitted to telemetry unit, pending bed placement.  on cardiac monitor at this time.

## 2025-04-29 NOTE — ED PROVIDER NOTE - CLINICAL SUMMARY MEDICAL DECISION MAKING FREE TEXT BOX
89 y/o M pt w/ PMHx of afib on eliquis presenting w/ SOB. went to  two days ago dx bronchitis given albuterol which hed never taken before. tonight worse SOB w/ palpitations. Afib w/ RVR on EKG. 89 y/o M pt w/ PMHx of afib on eliquis presenting w/ SOB. Reports LAUGHLIN, LE edema and orthopnea x 2-3 days. went to [facility] two days ago dx bronchitis given albuterol which hed never taken before. tonight worse SOB w/ palpitations. Afib w/ RVR on EKG Exam notable for diffuse rales and bilateral pitting LE edema. Concern for afib RVR and CHF exacerbation. Plan for labs, cardiac monitor, rate control, likely admission.

## 2025-04-29 NOTE — H&P ADULT - NSHPPHYSICALEXAM_GEN_ALL_CORE
GENERAL: pt examined bedside, laying comfortably in bed in NAD  HEENT: NC/AT, moist oral mucosa, clear conjunctiva, sclera nonicteric  RESPIRATORY: Normal respiratory effort; CTA b/l, no wheezing, rhonchi, rales  CARDIOVASCULAR: RRR, normal S1 and S2, no murmur/rub/gallop  ABDOMEN: soft, NT/ND, normoactive bowel sounds, no rebound/guarding  MSK: No joint deformities, edema, erythema  EXTREMITIES: No cynaosis, no clubbing, no lower extremity edema; Peripheral pulses are 2+ bilaterally  PSYCH: affect appropriate and cooperative  NEUROLOGY: A+O to person, place, and time, no focal neurologic deficits appreciated  SKIN: No rashes or no palpable lesions GENERAL: pt examined bedside, laying comfortably in bed in NAD, nontoxic appearing   HEENT: NC/AT, moist oral mucosa, clear conjunctiva, sclera nonicteric  RESPIRATORY: Normal respiratory effort, diffuse bibasilar rales, no wheezing or rhonchi  CARDIOVASCULAR: irregularly irregular rate/rhythm, normal S1 and S2, +2/6 systolic murmur  ABDOMEN: soft, NT/ND, +bowel sounds, no rebound/guarding  EXTREMITIES: No cynaosis, no clubbing, 1+ pretibial/pedal pitting edema  PSYCH: affect appropriate and cooperative  NEUROLOGY: A+O x3, no focal neurologic deficits appreciated  SKIN: b/l LE varicosities

## 2025-04-29 NOTE — ED PROVIDER NOTE - ATTENDING CONTRIBUTION TO CARE
Anay: I performed a face to face bedside interview with patient regarding history of present illness, review of symptoms and past medical history. I completed an independent physical exam.  I have discussed patient's plan of care with resident.   I agree with note as stated above including HISTORY OF PRESENT ILLNESS, HIV, PAST MEDICAL/SURGICAL/FAMILY/SOCIAL HISTORY, ALLERGIES AND HOME MEDICATIONS, REVIEW OF SYSTEMS, PHYSICAL EXAM, MEDICAL DECISION MAKING and any PROGRESS NOTES during the time I functioned as the attending physician for this patient unless otherwise noted. My brief assessment is as follows: 89 y/o M pt w/ PMHx of afib on eliquis presenting w/ SOB. Reports LAUGHLIN, LE edema and orthopnea x 2-3 days. went to  two days ago dx bronchitis given albuterol which hed never taken before. tonight worse SOB w/ palpitations. Afib w/ RVR on EKG Exam notable for diffuse rales and bilateral pitting LE edema. Concern for afib RVR and CHF exacerbation. Plan for labs, cardiac monitor, rate control, likely admission.

## 2025-04-29 NOTE — ED ADULT NURSE NOTE - OBJECTIVE STATEMENT
c/o worsening SOB and palpitations since 0200. diagnosed with bronchitis  @ urgent care and has been taking oral abx and albuterol inhaler. ECG showed a-fib RVR, hx a-fib on Eliquis. MD Cueva called to assess pt @ beside.

## 2025-04-29 NOTE — H&P ADULT - ASSESSMENT
89 y/o M w/ PMH of AF (on eliquis), HTN, HLD presented from [facility] c/o palpitations that started over night while at rest.  Pt states that he felt his heart was coming out of his chest so he came to hospital.  He also reports both of his legs have been swelling for the past 2 months.  VS significant for tachycardia/AF w/ rates in 150's now in low 100's and RR in high 20's now nL and satting well ORA.  Clinically nontoxic appearing but has irregularly irregular rate/rhythm, diffuse bibasilar rales and 1+ pretibial/pedal pitting edema.  Initial w/u significant for trop 23 x1, BNP 1994, EKG w/ AF rvr but no acute ischemic changes appreciated. CXR w/ mild pulm vascular congestion appreciate and bibasilar atelectatics R>L appreciated on wet read.  s/p 60mg dilt po, total of 35mg IV dilt and 20mg IV lasix in ED.  Will admit for AF RVR and possible new onset HF.       AF RVR   - Still in RVR but rates improving   - s/p 60mg dilt po, total of 35mg IV dilt and 20mg IV lasix in ED  - Will c/w home dilt but if TTE shows rEF d/c and changed to BB   - Will r/o underlying infection   - CHADSVASC = 3 (but if add HF is 4)  - Monitor on telemetry   - Cardio consulted ([facility])      SIRS likely 2/2 AF RVR and possible new decompensated HF  - Tachy, tachypneic on arrival but HR improving and RR nL   - Clinically no evidence of acute infection, nontoxic, no leukocytosis or bandemia and is afebrile   - CXR w/ mild pulm vascular congestion appreciate and bibasilar atelectasis R>L and no consolidation/infiltrate noted on wet read; official report pending   - Trops 23x 1 and BNP 1994 and hypervolemic on exam   - s/p 60mg dilt po, total of 35mg IV dilt and 20mg IV lasix in ED  - Will order infectious w/u as can precipitate AF RVR but will monitor off abx for now and hold IVFs given concern for vol overloaded   - Will repeat trop and EKG   - Check TSH, lipid panel, a1c and f/u TTE to assess for valvulopathy, EF and check WMA  - Start on IV diuresis monitor lytes, renal function and wean to po once euvolemic   - Check daily weights, strict I/O;s and fluid restrict   - Maintain K>4 and Mg>2  - Monitor on telemetry   - Cardiology consulted ([facility] as pt follows w/ [place])      HTN / HLD  - c/w home meds       VTE ppx:  on eliquis     Dispo: Acute.  Anticipate d/c back to [facility] in 2-4 days pending clinical course.             89 y/o M w/ PMH of AF (on eliquis), HTN, HLD presented from [facility] c/o palpitations that started over night while at rest.  Pt states that he felt his heart was coming out of his chest so he came to hospital.  He also reports both of his legs have been swelling for the past 2 months.  VS significant for tachycardia/AF w/ rates in 150's now in low 100's and RR in high 20's now nL and satting well ORA.  Clinically nontoxic appearing but has irregularly irregular rate/rhythm, diffuse bibasilar rales and 1+ pretibial/pedal pitting edema.  Initial w/u significant for trop 23 x1, BNP 1994, EKG w/ AF rvr but no acute ischemic changes appreciated. CXR w/ mild pulm vascular congestion appreciate and bibasilar atelectatics R>L appreciated on wet read.  s/p 60mg dilt po, total of 35mg IV dilt and 20mg IV lasix in ED.  Will admit for AF RVR and possible new onset HF.       AF RVR   - Still in RVR but rates improving   - s/p 60mg dilt po, total of 35mg IV dilt and 20mg IV lasix in ED  - Will resume home dilt and metoprolol   - If TTE shows rEF will need to d/c CCB   - Will r/o underlying infection   - CHADSVASC = 3 (but if add HF is 4)  - Monitor on telemetry   - Cardio consulted ([facility])      SIRS likely 2/2 AF RVR and possible new decompensated HF  - Tachy, tachypneic on arrival but HR improving and RR nL   - Clinically no evidence of acute infection, nontoxic, no leukocytosis or bandemia and is afebrile   - CXR w/ mild pulm vascular congestion appreciate and bibasilar atelectasis R>L and no consolidation/infiltrate noted on wet read; official report pending   - Trops 23x 1 and BNP 1994 and hypervolemic on exam   - s/p 60mg dilt po, total of 35mg IV dilt and 20mg IV lasix in ED  - Will order infectious w/u as can precipitate AF RVR but will monitor off abx for now and hold IVFs given concern for vol overloaded   - Will repeat trop and EKG   - Check TSH, lipid panel, a1c and f/u TTE to assess for valvulopathy, EF and check WMA  - Start on IV diuresis monitor lytes, renal function and wean to po once euvolemic   - Check daily weights, strict I/O;s and fluid restrict   - Maintain K>4 and Mg>2  - Monitor on telemetry   - Cardiology consulted ([facility] as pt follows w/ [place])      HTN / HLD  - c/w home dilt and metoprolol  - Not on statin       VTE ppx:  on eliquis     Dispo: Acute.  Anticipate d/c back to long-term in 2-4 days pending clinical course.

## 2025-04-29 NOTE — CONSULT NOTE ADULT - SUBJECTIVE AND OBJECTIVE BOX
CHIEF COMPLAINT:    HPI: 89 y/o M w/ PMH of CAD,  Atrial Flutter s/p CV (on eliquis), HTN, HLD presented from [facility] c/o palpitations that started over night while at rest.  Pt states that he felt his heart was coming out of his chest so he came to hospital.  He also reports both of his legs have been swelling for the past 2 months.  He denies sob/hebert, orthopnea, CP, palpitations, abd pain, N/V, diarrhea.  He also denies recent cold/sick contacts, recent travel, fevers, chills, dysuria.  VS in the ED significant for tachycardia/AF w/ rates in 150's now in low 100's and RR in high 20's now nL and satting well ORA.  Clinically nontoxic appearing but has irregularly irregular rate/rhythm, diffuse bibasilar rales and 1+ pretibial/pedal pitting edema.  Initial w/u significant for trop 23 x1, BNP 1994, EKG w/ AF rvr but no acute ischemic changes appreciated. CXR w/ mild pulm vascular congestion appreciate and bibasilar atelectatics R>L appreciated on wet read.  s/p 60mg dilt po, total of 35mg IV dilt and 20mg IV lasix in ED.  Will admit for AF RVR and possible new onset HF.      PAST MEDICAL & SURGICAL HISTORY:  Hypertension      Afib      Hyperlipidemia      H/O melanoma excision      History of appendectomy      History of tonsillectomy          Allergies    amoxicillin (Unknown)    Intolerances        MEDICATIONS  (STANDING):  apixaban 5 milliGRAM(s) Oral two times a day  diltiazem    Tablet 30 milliGRAM(s) Oral four times a day  furosemide   Injectable 20 milliGRAM(s) IV Push two times a day  metoprolol succinate  milliGRAM(s) Oral daily  metoprolol succinate ER 25 milliGRAM(s) Oral at bedtime    MEDICATIONS  (PRN):      FAMILY HISTORY:      ***No family history of premature coronary artery disease or sudden cardiac death    SOCIAL HISTORY:  Smoking-  Alcohol-  Illicit Drug use-    REVIEW OF SYSTEMS:  Constitutional: [ ] fever, [ ]weight loss,  [ ]fatigue  Eyes: [ ] visual changes  Respiratory: [ ]shortness of breath;  [ ] cough, [ ]wheezing, [ ]chills, [ ]hemoptysis  Cardiovascular: [ ] chest pain, [ ]palpitations, [ ]dizziness,  [ ]leg swelling [ ]syncope  Gastrointestinal: [ ] abdominal pain, [ ]nausea, [ ]vomiting,  [ ]diarrhea   Genitourinary: [ ] dysuria, [ ] hematuria  Neurologic: [ ] headaches [ ] tremors  [ ] weakness [ ] lightheadedness  Skin: [ ] itching, [ ]burning, [ ] rashes  Endocrine: [ ] heat or cold intolerance  Musculoskeletal: [ ] joint pain or swelling; [ ] muscle, back, or extremity pain  Psychiatric: [ ] depression, [ ]anxiety, [ ]mood swings, or [ ]difficulty sleeping  Hematologic: [ ] easy bruising, [ ] bleeding gums     [ x] All others negative	  [ ] Unable to obtain    Vital Signs Last 24 Hrs  T(C): 36.5 (29 Apr 2025 07:17), Max: 36.7 (29 Apr 2025 06:40)  T(F): 97.7 (29 Apr 2025 07:17), Max: 98 (29 Apr 2025 06:40)  HR: 97 (29 Apr 2025 07:17) (97 - 168)  BP: 101/76 (29 Apr 2025 07:17) (101/76 - 155/106)  BP(mean): --  RR: 18 (29 Apr 2025 07:17) (18 - 28)  SpO2: 94% (29 Apr 2025 07:17) (93% - 100%)    Parameters below as of 29 Apr 2025 07:17  Patient On (Oxygen Delivery Method): room air      I&O's Summary      PHYSICAL EXAM:  General: No acute distress  HEENT: EOMI  Neck:  No JVD  Lungs: Clear to auscultation bilaterally; No rales or wheezing  Heart: Regular rate and rhythm; No murmurs, rubs, or gallops  Abdomen: soft, non tender, non distended   Extremities: warm, no edema   Nervous system:  Alert & Oriented X3  Psychiatric: Normal affect  Skin: No rashes or lesions    LABS:  04-29    140  |  101  |  20.5[H]  ----------------------------<  98  4.3   |  25.0  |  1.14    Ca    8.7      29 Apr 2025 04:55    TPro  7.2  /  Alb  4.0  /  TBili  0.5  /  DBili  x   /  AST  22  /  ALT  15  /  AlkPhos  99  04-29    Creatinine Trend: 1.14<--                        15.0   7.74  )-----------( 233      ( 29 Apr 2025 04:55 )             46.3     PT/INR - ( 29 Apr 2025 04:55 )   PT: 17.3 sec;   INR: 1.50 ratio         PTT - ( 29 Apr 2025 04:55 )  PTT:36.4 sec    Lipid Panel:   Cardiac Enzymes:           RADIOLOGY:    < from: 12 Lead ECG (04.29.25 @ 04:44) >  Atrial fibrillation with rapid ventricular response  Left axis deviation  Nonspecific ST and T wave abnormality          TELEMETRY:    ECHO:    STRESS TEST:    CATHETERIZATION: CHIEF COMPLAINT:    HPI: 89 y/o M w/ PMH of CAD,  Atrial Flutter s/p CV (on eliquis), HTN, HLD presented from [facility] c/o palpitations that started over night while at rest.  Pt states that he felt his heart was coming out of his chest so he came to hospital.  He also reports both of his legs have been swelling for the past 2 months.  He denies sob/hebert, orthopnea, CP, palpitations, abd pain, N/V, diarrhea.  He also denies recent cold/sick contacts, recent travel, fevers, chills, dysuria.  VS in the ED significant for tachycardia/AF w/ rates in 150's now in low 100's and RR in high 20's now nL and satting well ORA.  Clinically nontoxic appearing but has irregularly irregular rate/rhythm, diffuse bibasilar rales and 1+ pretibial/pedal pitting edema.  Initial w/u significant for trop 23 x1, BNP 1994, EKG w/ AF rvr but no acute ischemic changes appreciated. CXR w/ mild pulm vascular congestion appreciate and bibasilar atelectatics R>L appreciated on wet read.  s/p 60mg dilt po, total of 35mg IV dilt and 20mg IV lasix in ED.  Will admit for AF RVR and possible new onset HF.      PAST MEDICAL & SURGICAL HISTORY:  Hypertension      Afib      Hyperlipidemia      H/O melanoma excision      History of appendectomy      History of tonsillectomy          Allergies    amoxicillin (Unknown)    Intolerances        MEDICATIONS  (STANDING):  apixaban 5 milliGRAM(s) Oral two times a day  diltiazem    Tablet 30 milliGRAM(s) Oral four times a day  furosemide   Injectable 20 milliGRAM(s) IV Push two times a day  metoprolol succinate  milliGRAM(s) Oral daily  metoprolol succinate ER 25 milliGRAM(s) Oral at bedtime    MEDICATIONS  (PRN):      FAMILY HISTORY:      ***No family history of premature coronary artery disease or sudden cardiac death    SOCIAL HISTORY:  Smoking-  Alcohol-  Illicit Drug use-    REVIEW OF SYSTEMS:  Constitutional: [ ] fever, [ ]weight loss,  [ ]fatigue  Eyes: [ ] visual changes  Respiratory: [x ]shortness of breath;  [ ] cough, [ ]wheezing, [ ]chills, [ ]hemoptysis  Cardiovascular: [ ] chest pain, [ ]palpitations, [ ]dizziness,  [ ]leg swelling [ ]syncope  Gastrointestinal: [ ] abdominal pain, [ ]nausea, [ ]vomiting,  [ ]diarrhea   Genitourinary: [ ] dysuria, [ ] hematuria  Neurologic: [ ] headaches [ ] tremors  [ ] weakness [ ] lightheadedness  Skin: [ ] itching, [ ]burning, [ ] rashes  Endocrine: [ ] heat or cold intolerance  Musculoskeletal: [ ] joint pain or swelling; [ ] muscle, back, or extremity pain  Psychiatric: [ ] depression, [ ]anxiety, [ ]mood swings, or [ ]difficulty sleeping  Hematologic: [ ] easy bruising, [ ] bleeding gums     [ x] All others negative	  [ ] Unable to obtain    Vital Signs Last 24 Hrs  T(C): 36.5 (29 Apr 2025 07:17), Max: 36.7 (29 Apr 2025 06:40)  T(F): 97.7 (29 Apr 2025 07:17), Max: 98 (29 Apr 2025 06:40)  HR: 97 (29 Apr 2025 07:17) (97 - 168)  BP: 101/76 (29 Apr 2025 07:17) (101/76 - 155/106)  BP(mean): --  RR: 18 (29 Apr 2025 07:17) (18 - 28)  SpO2: 94% (29 Apr 2025 07:17) (93% - 100%)    Parameters below as of 29 Apr 2025 07:17  Patient On (Oxygen Delivery Method): room air      I&O's Summary      PHYSICAL EXAM:  General: No acute distress  HEENT: EOMI  Neck:  No JVD  Lungs: Clear to auscultation bilaterally; No rales or wheezing  Heart: irregular rate and rhythm; No murmurs, rubs, or gallops  Abdomen: soft, non tender, non distended   Extremities: warm, 1+edema  Nervous system:  Alert & Oriented X3  Psychiatric: Normal affect  Skin: No rashes or lesions    LABS:  04-29    140  |  101  |  20.5[H]  ----------------------------<  98  4.3   |  25.0  |  1.14    Ca    8.7      29 Apr 2025 04:55    TPro  7.2  /  Alb  4.0  /  TBili  0.5  /  DBili  x   /  AST  22  /  ALT  15  /  AlkPhos  99  04-29    Creatinine Trend: 1.14<--                        15.0   7.74  )-----------( 233      ( 29 Apr 2025 04:55 )             46.3     PT/INR - ( 29 Apr 2025 04:55 )   PT: 17.3 sec;   INR: 1.50 ratio         PTT - ( 29 Apr 2025 04:55 )  PTT:36.4 sec    Lipid Panel:   Cardiac Enzymes:           RADIOLOGY:    < from: 12 Lead ECG (04.29.25 @ 04:44) >  Atrial fibrillation with rapid ventricular response  Left axis deviation  Nonspecific ST and T wave abnormality          TELEMETRY: afib    ECHO:    STRESS TEST:    CATHETERIZATION:

## 2025-04-29 NOTE — ED ADULT NURSE REASSESSMENT NOTE - NS ED NURSE REASSESS COMMENT FT1
Pt moved to B7L. Report given to Juan Alberto JONES. Pt in controlled afib on mon. NAD at this time. VSS. Resp even and unlabored. Awaiting HR bed at this time. Pt updated on plan of care and verbalizes understanding.

## 2025-04-30 LAB
-  STAPHYLOCOCCUS EPIDERMIDIS, METHICILLIN RESISTANT: SIGNIFICANT CHANGE UP
ALBUMIN SERPL ELPH-MCNC: 3.5 G/DL — SIGNIFICANT CHANGE UP (ref 3.3–5.2)
ALP SERPL-CCNC: 87 U/L — SIGNIFICANT CHANGE UP (ref 40–120)
ALT FLD-CCNC: 12 U/L — SIGNIFICANT CHANGE UP
ANION GAP SERPL CALC-SCNC: 14 MMOL/L — SIGNIFICANT CHANGE UP (ref 5–17)
AST SERPL-CCNC: 20 U/L — SIGNIFICANT CHANGE UP
BASOPHILS # BLD AUTO: 0.02 K/UL — SIGNIFICANT CHANGE UP (ref 0–0.2)
BASOPHILS NFR BLD AUTO: 0.3 % — SIGNIFICANT CHANGE UP (ref 0–2)
BILIRUB SERPL-MCNC: 0.7 MG/DL — SIGNIFICANT CHANGE UP (ref 0.4–2)
BUN SERPL-MCNC: 18.8 MG/DL — SIGNIFICANT CHANGE UP (ref 8–20)
CALCIUM SERPL-MCNC: 8.6 MG/DL — SIGNIFICANT CHANGE UP (ref 8.4–10.5)
CHLORIDE SERPL-SCNC: 102 MMOL/L — SIGNIFICANT CHANGE UP (ref 96–108)
CO2 SERPL-SCNC: 23 MMOL/L — SIGNIFICANT CHANGE UP (ref 22–29)
CREAT SERPL-MCNC: 1.08 MG/DL — SIGNIFICANT CHANGE UP (ref 0.5–1.3)
EGFR: 63 ML/MIN/1.73M2 — SIGNIFICANT CHANGE UP
EGFR: 63 ML/MIN/1.73M2 — SIGNIFICANT CHANGE UP
EOSINOPHIL # BLD AUTO: 0.11 K/UL — SIGNIFICANT CHANGE UP (ref 0–0.5)
EOSINOPHIL NFR BLD AUTO: 1.5 % — SIGNIFICANT CHANGE UP (ref 0–6)
GLUCOSE SERPL-MCNC: 98 MG/DL — SIGNIFICANT CHANGE UP (ref 70–99)
GRAM STN FLD: ABNORMAL
HCT VFR BLD CALC: 40.7 % — SIGNIFICANT CHANGE UP (ref 39–50)
HGB BLD-MCNC: 13.4 G/DL — SIGNIFICANT CHANGE UP (ref 13–17)
IMM GRANULOCYTES # BLD AUTO: 0.03 K/UL — SIGNIFICANT CHANGE UP (ref 0–0.07)
IMM GRANULOCYTES NFR BLD AUTO: 0.4 % — SIGNIFICANT CHANGE UP (ref 0–0.9)
LYMPHOCYTES # BLD AUTO: 1.08 K/UL — SIGNIFICANT CHANGE UP (ref 1–3.3)
LYMPHOCYTES NFR BLD AUTO: 15.1 % — SIGNIFICANT CHANGE UP (ref 13–44)
MAGNESIUM SERPL-MCNC: 1.8 MG/DL — SIGNIFICANT CHANGE UP (ref 1.6–2.6)
MCHC RBC-ENTMCNC: 31.9 PG — SIGNIFICANT CHANGE UP (ref 27–34)
MCHC RBC-ENTMCNC: 32.9 G/DL — SIGNIFICANT CHANGE UP (ref 32–36)
MCV RBC AUTO: 96.9 FL — SIGNIFICANT CHANGE UP (ref 80–100)
METHOD TYPE: SIGNIFICANT CHANGE UP
MONOCYTES # BLD AUTO: 0.72 K/UL — SIGNIFICANT CHANGE UP (ref 0–0.9)
MONOCYTES NFR BLD AUTO: 10.1 % — SIGNIFICANT CHANGE UP (ref 2–14)
NEUTROPHILS # BLD AUTO: 5.2 K/UL — SIGNIFICANT CHANGE UP (ref 1.8–7.4)
NEUTROPHILS NFR BLD AUTO: 72.6 % — SIGNIFICANT CHANGE UP (ref 43–77)
NRBC # BLD AUTO: 0 K/UL — SIGNIFICANT CHANGE UP (ref 0–0)
NRBC # FLD: 0 K/UL — SIGNIFICANT CHANGE UP (ref 0–0)
NRBC BLD AUTO-RTO: 0 /100 WBCS — SIGNIFICANT CHANGE UP (ref 0–0)
PHOSPHATE SERPL-MCNC: 3.1 MG/DL — SIGNIFICANT CHANGE UP (ref 2.4–4.7)
PLATELET # BLD AUTO: 261 K/UL — SIGNIFICANT CHANGE UP (ref 150–400)
PMV BLD: 9.8 FL — SIGNIFICANT CHANGE UP (ref 7–13)
POTASSIUM SERPL-MCNC: 3.7 MMOL/L — SIGNIFICANT CHANGE UP (ref 3.5–5.3)
POTASSIUM SERPL-SCNC: 3.7 MMOL/L — SIGNIFICANT CHANGE UP (ref 3.5–5.3)
PROT SERPL-MCNC: 6.3 G/DL — LOW (ref 6.6–8.7)
RBC # BLD: 4.2 M/UL — SIGNIFICANT CHANGE UP (ref 4.2–5.8)
RBC # FLD: 13.4 % — SIGNIFICANT CHANGE UP (ref 10.3–14.5)
SODIUM SERPL-SCNC: 139 MMOL/L — SIGNIFICANT CHANGE UP (ref 135–145)
SPECIMEN SOURCE: SIGNIFICANT CHANGE UP
WBC # BLD: 7.16 K/UL — SIGNIFICANT CHANGE UP (ref 3.8–10.5)
WBC # FLD AUTO: 7.16 K/UL — SIGNIFICANT CHANGE UP (ref 3.8–10.5)

## 2025-04-30 PROCEDURE — 99233 SBSQ HOSP IP/OBS HIGH 50: CPT

## 2025-04-30 RX ORDER — METOPROLOL SUCCINATE 50 MG/1
2.5 TABLET, EXTENDED RELEASE ORAL EVERY 6 HOURS
Refills: 0 | Status: DISCONTINUED | OUTPATIENT
Start: 2025-04-30 | End: 2025-05-02

## 2025-04-30 RX ORDER — METOPROLOL SUCCINATE 50 MG/1
2.5 TABLET, EXTENDED RELEASE ORAL ONCE
Refills: 0 | Status: COMPLETED | OUTPATIENT
Start: 2025-04-30 | End: 2025-04-30

## 2025-04-30 RX ORDER — DILTIAZEM HYDROCHLORIDE 120 MG/1
60 CAPSULE, EXTENDED RELEASE ORAL EVERY 6 HOURS
Refills: 0 | Status: DISCONTINUED | OUTPATIENT
Start: 2025-04-30 | End: 2025-05-02

## 2025-04-30 RX ORDER — FUROSEMIDE 10 MG/ML
20 INJECTION INTRAMUSCULAR; INTRAVENOUS DAILY
Refills: 0 | Status: DISCONTINUED | OUTPATIENT
Start: 2025-05-01 | End: 2025-05-01

## 2025-04-30 RX ORDER — METOPROLOL SUCCINATE 50 MG/1
5 TABLET, EXTENDED RELEASE ORAL ONCE
Refills: 0 | Status: COMPLETED | OUTPATIENT
Start: 2025-04-30 | End: 2025-04-30

## 2025-04-30 RX ADMIN — APIXABAN 5 MILLIGRAM(S): 2.5 TABLET, FILM COATED ORAL at 05:52

## 2025-04-30 RX ADMIN — METOPROLOL SUCCINATE 100 MILLIGRAM(S): 50 TABLET, EXTENDED RELEASE ORAL at 05:52

## 2025-04-30 RX ADMIN — DILTIAZEM HYDROCHLORIDE 30 MILLIGRAM(S): 120 CAPSULE, EXTENDED RELEASE ORAL at 11:25

## 2025-04-30 RX ADMIN — DILTIAZEM HYDROCHLORIDE 60 MILLIGRAM(S): 120 CAPSULE, EXTENDED RELEASE ORAL at 23:23

## 2025-04-30 RX ADMIN — METOPROLOL SUCCINATE 5 MILLIGRAM(S): 50 TABLET, EXTENDED RELEASE ORAL at 15:10

## 2025-04-30 RX ADMIN — METOPROLOL SUCCINATE 2.5 MILLIGRAM(S): 50 TABLET, EXTENDED RELEASE ORAL at 12:23

## 2025-04-30 RX ADMIN — METOPROLOL SUCCINATE 2.5 MILLIGRAM(S): 50 TABLET, EXTENDED RELEASE ORAL at 03:59

## 2025-04-30 RX ADMIN — METOPROLOL SUCCINATE 25 MILLIGRAM(S): 50 TABLET, EXTENDED RELEASE ORAL at 21:26

## 2025-04-30 RX ADMIN — DILTIAZEM HYDROCHLORIDE 60 MILLIGRAM(S): 120 CAPSULE, EXTENDED RELEASE ORAL at 18:14

## 2025-04-30 RX ADMIN — APIXABAN 5 MILLIGRAM(S): 2.5 TABLET, FILM COATED ORAL at 18:14

## 2025-04-30 RX ADMIN — DILTIAZEM HYDROCHLORIDE 30 MILLIGRAM(S): 120 CAPSULE, EXTENDED RELEASE ORAL at 05:52

## 2025-04-30 RX ADMIN — ATORVASTATIN CALCIUM 20 MILLIGRAM(S): 80 TABLET, FILM COATED ORAL at 21:26

## 2025-04-30 RX ADMIN — FUROSEMIDE 40 MILLIGRAM(S): 10 INJECTION INTRAMUSCULAR; INTRAVENOUS at 05:52

## 2025-04-30 NOTE — PHYSICAL THERAPY INITIAL EVALUATION ADULT - PERTINENT HX OF CURRENT PROBLEM, REHAB EVAL
89 y/o M w/ PMH of CAD,  Atrial Flutter s/p CV (on eliquis), HTN, HLD presented from [facility] c/o palpitations that started over night while at rest.  Pt states that he felt his heart was coming out of his chest so he came to hospital.  He also reports both of his legs have been swelling for the past 2 months.

## 2025-04-30 NOTE — PROGRESS NOTE ADULT - SUBJECTIVE AND OBJECTIVE BOX
LIBBY MENDOZAN  [id number]      Chief Complaint:  AF with RVR/HMPV/CHF    Interval History:  Patient without c/o.  Denies CP, SOB, palps.    Tele:  AF with RVR      apixaban 5 milliGRAM(s) Oral two times a day  atorvastatin 20 milliGRAM(s) Oral at bedtime  diltiazem    Tablet 30 milliGRAM(s) Oral four times a day  furosemide   Injectable 40 milliGRAM(s) IV Push daily  metoprolol succinate  milliGRAM(s) Oral daily  metoprolol succinate ER 25 milliGRAM(s) Oral at bedtime  metoprolol tartrate Injectable 2.5 milliGRAM(s) IV Push every 6 hours PRN          Physical Exam:  T(C): 36.5 (04-30-25 @ 07:16), Max: 36.7 (04-29-25 @ 15:39)  HR: 125 (04-30-25 @ 12:20) (107 - 150)  BP: 113/76 (04-30-25 @ 11:15) (102/75 - 134/83)  RR: 17 (04-30-25 @ 07:16) (17 - 18)  SpO2: 95% (04-30-25 @ 07:16) (92% - 95%)  Wt(kg): --  General: Comfortable in NAD  Neck: No JVD  CVS: nl s1s2, tachy, irreg  Pulm: Diminished with scant rhonchi b/l  Abd: soft, non-tender  Ext: Trace pedal edema b/l  Neuro A&O x3  Psych: Normal affect      Labs:   30 Apr 2025 04:50    139    |  102    |  18.8   ----------------------------<  98     3.7     |  23.0   |  1.08     Ca    8.6        30 Apr 2025 04:50  Phos  3.1       30 Apr 2025 04:50  Mg     1.8       30 Apr 2025 04:50    TPro  6.3    /  Alb  3.5    /  TBili  0.7    /  DBili  x      /  AST  20     /  ALT  12     /  AlkPhos  87     30 Apr 2025 04:50                          13.4   7.16  )-----------( 261      ( 30 Apr 2025 04:50 )             40.7     PT/INR - ( 29 Apr 2025 04:55 )   PT: 17.3 sec;   INR: 1.50 ratio         PTT - ( 29 Apr 2025 04:55 )  PTT:36.4 sec    Echo:   1. Left ventricular systolic function is normal with an ejection fraction visually estimated at 55 to 60 %.   2. Left atrium is normal in size.   3. Estimated pulmonary artery systolic pressure is 25 mmHg.        Assessment:  89 y/o M w/ PMH of CAD,  Atrial Flutter s/p CV (on eliquis), HTN, HLD presented from [facility] c/o palpitations that started over night while at rest.  Pt states that he felt his heart was coming out of his chest so he came to hospital.  He also reports both of his legs have been swelling for the past 2 months.  He denies sob/hebert, orthopnea, CP, palpitations, abd pain, N/V, diarrhea.  He also denies recent cold/sick contacts, recent travel, fevers, chills, dysuria.  VS in the ED significant for tachycardia/AF w/ rates in 150's now in low 100's and RR in high 20's now nL and satting well ORA.  Clinically nontoxic appearing but has irregularly irregular rate/rhythm, diffuse bibasilar rales and 1+ pretibial/pedal pitting edema.  Initial w/u significant for trop 23 x1, BNP 1994, EKG w/ AF rvr but no acute ischemic changes appreciated. CXR w/ mild pulm vascular congestion appreciate and bibasilar atelectatics R>L appreciated on wet read.  s/p 60mg dilt po, total of 35mg IV dilt and 20mg IV lasix in ED.  Will admit for AF RVR and possible new onset HF.  -Echo with preserved EF and no significant structural dz.  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  -Appears more euvolemic on exam.    Plan :  1. Increase Cardizem to 30mg Q6H and continue Metoprolol at current dose.  2. Continue Metoprolol IV PRN as well.  3. Lasix 40mg IV QD, monitor renal function and electrolytes.  Likely change to po tomorrow.  4. Continue tele monitoring.  5. HPMV per primary team.         LIBBY MENDOZAN  [id number]      Chief Complaint:  AF with RVR/HMPV/CHF    Interval History:  Patient without c/o.  Denies CP, SOB, palps.    Tele:  AF with RVR      apixaban 5 milliGRAM(s) Oral two times a day  atorvastatin 20 milliGRAM(s) Oral at bedtime  diltiazem    Tablet 30 milliGRAM(s) Oral four times a day  furosemide   Injectable 40 milliGRAM(s) IV Push daily  metoprolol succinate  milliGRAM(s) Oral daily  metoprolol succinate ER 25 milliGRAM(s) Oral at bedtime  metoprolol tartrate Injectable 2.5 milliGRAM(s) IV Push every 6 hours PRN          Physical Exam:  T(C): 36.5 (04-30-25 @ 07:16), Max: 36.7 (04-29-25 @ 15:39)  HR: 125 (04-30-25 @ 12:20) (107 - 150)  BP: 113/76 (04-30-25 @ 11:15) (102/75 - 134/83)  RR: 17 (04-30-25 @ 07:16) (17 - 18)  SpO2: 95% (04-30-25 @ 07:16) (92% - 95%)  Wt(kg): --  General: Comfortable in NAD  Neck: No JVD  CVS: nl s1s2, tachy, irreg  Pulm: Diminished with scant rhonchi b/l  Abd: soft, non-tender  Ext: Trace pedal edema b/l  Neuro A&O x3  Psych: Normal affect      Labs:   30 Apr 2025 04:50    139    |  102    |  18.8   ----------------------------<  98     3.7     |  23.0   |  1.08     Ca    8.6        30 Apr 2025 04:50  Phos  3.1       30 Apr 2025 04:50  Mg     1.8       30 Apr 2025 04:50    TPro  6.3    /  Alb  3.5    /  TBili  0.7    /  DBili  x      /  AST  20     /  ALT  12     /  AlkPhos  87     30 Apr 2025 04:50                          13.4   7.16  )-----------( 261      ( 30 Apr 2025 04:50 )             40.7     PT/INR - ( 29 Apr 2025 04:55 )   PT: 17.3 sec;   INR: 1.50 ratio         PTT - ( 29 Apr 2025 04:55 )  PTT:36.4 sec    Echo:   1. Left ventricular systolic function is normal with an ejection fraction visually estimated at 55 to 60 %.   2. Left atrium is normal in size.   3. Estimated pulmonary artery systolic pressure is 25 mmHg.        Assessment:  89 y/o M w/ PMH of CAD,  Atrial Flutter s/p CV (on eliquis), HTN, HLD presented from [facility] c/o palpitations that started over night while at rest.  Pt states that he felt his heart was coming out of his chest so he came to hospital.  He also reports both of his legs have been swelling for the past 2 months.  He denies sob/hebert, orthopnea, CP, palpitations, abd pain, N/V, diarrhea.  He also denies recent cold/sick contacts, recent travel, fevers, chills, dysuria.  VS in the ED significant for tachycardia/AF w/ rates in 150's now in low 100's and RR in high 20's now nL and satting well ORA.  Clinically nontoxic appearing but has irregularly irregular rate/rhythm, diffuse bibasilar rales and 1+ pretibial/pedal pitting edema.  Initial w/u significant for trop 23 x1, BNP 1994, EKG w/ AF rvr but no acute ischemic changes appreciated. CXR w/ mild pulm vascular congestion appreciate and bibasilar atelectatics R>L appreciated on wet read.  s/p 60mg dilt po, total of 35mg IV dilt and 20mg IV lasix in ED.  Will admit for AF RVR and possible new onset HF.  -Echo with preserved EF and no significant structural dz.  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  -Appears more euvolemic on exam.    Plan :  1. Increase Cardizem to 30mg Q6H and continue Metoprolol at current dose.  2. Continue Metoprolol IV PRN as well.  3. Lasix 40mg IV QD, monitor renal function and electrolytes.  Likely change to po tomorrow.  4. Continue tele monitoring.  5. A/C with Eliquis.  6. HPMV per primary team.  7. As HPMV clears and if still RVR may have to consider DCCV.

## 2025-04-30 NOTE — PROGRESS NOTE ADULT - ASSESSMENT
89 y/o M w/ PMHx of AF (on eliquis), HTN, HLD, ex smoker, presented from [facility] (independent, moved there 2 months ago to live with his wife) c/o palpitations x 2 months that worsened overnight, associated with b/l LE edema, SOB, and 2 pillow orthopnea x 2 months. Now, pt is euvolemic, AF with RVR still under management.    Plan  #AF with RVR likely 2/2 hMPV  - s/p 60mg dilt po, total of 35mg IV dilt and 20mg IV lasix in ED  - Continue home diltiazem and metoprolol   - If TTE showing normal EF  - hMPV (+), offer supportive care   - CHADSVASC = 3 (but if add HF is 4)  - Monitor on telemetry   - Cardio consulted ([facility])    #SIRS likely 2/2 AF RVR and possible new decompensated HF  - Tachycardia, tachypneic on arrival but HR and RR improved   - Nontoxic, no leukocytosis or bandemia and is afebrile   - CXR w/ mild pulm vascular congestion appreciate and bibasilar atelectasis R>L and no consolidation/infiltrate noted on wet read; small left base retrocardiac infiltrate which is new since prior.  - Trops 23, repeat 23, and BNP 1994   - s/p 60mg diltiazem po, total of 35mg IV diltiazem and 20mg IV lasix in ED  - hold IVFs given concern for vol overloaded   - hypervolemic on admission, now euvolemic - discontinue IV diuresis, monitor lytes, renal function  - Check daily weights, strict I/O;s and fluid restrict   - repeat EKG  - TSH normal  - lipid panel abnormal, started on atorvastatin  - a1c 6.2  - Maintain K>4 and Mg>2  - Monitor on telemetry   - Cardiology consulted ([facility] as pt follows w/ [place])    HTN / HLD  - c/w home diltiazem and metoprolol  - Started on atorvastatin      VTE ppx:  on eliquis     Dispo: Acute.  Anticipate d/c back to [facility] in 2-4 days pending clinical course.      89 y/o M w/ PMHx of AF (on eliquis), HTN, HLD, ex smoker, presented from [facility] (independent, moved there 2 months ago to live with his wife) c/o palpitations x 2 months that worsened overnight, associated with b/l LE edema, SOB, and 2 pillow orthopnea x 2 months. Now, pt is euvolemic, AF with RVR still under management.    Plan  #AF with RVR likely 2/2 hMPV  - s/p 60mg dilt po, total of 35mg IV dilt and 20mg IV lasix in ED  - Continue home diltiazem and metoprolol   - TTE showing normal EF, pulmonary HTN  - hMPV (+), offer supportive care   - CHADSVASC = 3 (but if add HF is 4)  - Continue eliquis   - Monitor on telemetry   - Cardio consulted ([facility])      #SIRS likely 2/2 AF RVR and possible new decompensated HF  # Pulmonary HTN   - Tachycardia, tachypneic on arrival but HR and RR improved   - Nontoxic, no leukocytosis or bandemia and is afebrile   - CXR w/ mild pulm vascular congestion appreciate and bibasilar atelectasis R>L and no consolidation/infiltrate noted on wet read; small left base retrocardiac infiltrate which is new since prior.  - Trops 23, repeat 23, and BNP 1994   - s/p 60mg diltiazem po, total of 35mg IV diltiazem and 20mg IV lasix in ED  - hold IVFs given concern for vol overloaded   - hypervolemic on admission, now euvolemic - discontinue IV diuresis, monitor lytes, renal function  - Check daily weights, strict I/O;s and fluid restrict   - repeat EKG  - TSH normal  - lipid panel abnormal, started on atorvastatin  - a1c 6.2  - Maintain K>4 and Mg>2  - Monitor on telemetry   - Cardiology consulted ([facility] as pt follows w/ [place])    #HTN / HLD  - c/w home diltiazem and metoprolol  - Started on atorvastatin      VTE ppx:  on eliquis     Dispo: Acute.  Anticipate d/c back to [facility] in 1-2 days pending clinical course.      89 y/o M w/ PMHx of AF (on eliquis), HTN, HLD, ex smoker, presented from [facility] (independent, moved there 2 months ago to live with his wife) c/o palpitations x 2 months that worsened overnight, associated with b/l LE edema, SOB, and 2 pillow orthopnea x 2 months. Now, pt is euvolemic, AF with RVR still under management.    Plan  #AF with RVR likely 2/2 hMPV  - s/p 60mg dilt po, total of 35mg IV dilt and 20mg IV lasix in ED  - Continue home diltiazem and metoprolol   - TTE showing normal EF, pulmonary HTN  - hMPV (+), offer supportive care   - CHADSVASC = 3 (but if add HF is 4)  - Continue eliquis   - Monitor on telemetry   - Cardio consulted ([facility])      #SIRS likely 2/2 hMPV  #AF RVR and possible new decompensated HF  # Pulmonary HTN   - Tachycardia, tachypneic on arrival but HR and RR improved   - Nontoxic, no leukocytosis or bandemia and is afebrile   - CXR w/ mild pulm vascular congestion appreciate and bibasilar atelectasis R>L and no consolidation/infiltrate noted on wet read; small left base retrocardiac infiltrate which is new since prior.  - Trops 23, repeat 23, and BNP 1994   - s/p 60mg diltiazem po, total of 35mg IV diltiazem and 20mg IV lasix in ED  - hold IVFs given concern for vol overloaded   - hypervolemic on admission, now euvolemic - discontinue IV diuresis, monitor lytes, renal function  - Check daily weights, strict I/O;s and fluid restrict   - repeat EKG  - TSH normal  - lipid panel abnormal, started on atorvastatin  - a1c 6.2  - Maintain K>4 and Mg>2  - Monitor on telemetry   - [facility]iology [facility]    #HTN / HLD  - c/w home diltiazem and metoprolol  - Started on atorvastatin      VTE ppx:  on eliquis     Dispo: Acute.  Anticipate d/c back to [facility] in 1-2 days pending clinical course.

## 2025-04-30 NOTE — PROGRESS NOTE ADULT - SUBJECTIVE AND OBJECTIVE BOX
SUBJECTIVE  BRIEF HOSPITAL COURSE SUMMARY: 89 y/o M w/ PMHx of AF (on eliquis), HTN, HLD, ex smoker, presented from [facility] (independent, moved there 2 months ago to live with his wife) c/o palpitations x 2 months that worsened overnight, associated with b/l LE edema, SOB, and 2 pillow orthopnea x 2 months. In ED, pt had tachycardia/AF w/ rates in 150's, tachypnea to high 20's, hypertension (155/106). Clinically nontoxic appearing but has irregularly irregular rate/rhythm, diffuse bibasilar rales and 1+ pretibial/pedal pitting edema.  Initial w/u significant for trop 23 x1, BNP 1994, EKG w/ AF RVR but no acute ischemic changes appreciated. CXR w/ mild pulm vascular congestion appreciate and bibasilar atelectatics R>L appreciated on wet read. s/p 60mg dilt po, total of 35mg IV dilt and 20mg IV lasix in ED.  Admitted to medicine for AF RVR and possible new onset HF. AF now rate controlled, RR normalized and sating well. Reports productive cough with beige sputum, nonbloody x 2 weeks. hMPV result was positive. repeat trop was 23. lipid panel showed elevated LDL and decreased HDL, started on statin. TSH normal. TTE results showed EF 55 to 60%, normal left atrium size, pulmonary artery pressure of 25 mmHg. Currently receiving CCB and beta blocker.    LAST 24 HOURS: Received one IV push of Lopressor overnight. Patient is tachycardic to low 100s.  TODAY: Pt seen and examined at bedside this AM. Reports productive cough with beige sputum, brown urine, +BM this morning. Pt otherwise offers no acute complaints. Denies palpitations, SOB, wheezing, CP, abdominal pain, nausea, vomiting, diarrhea, fevers, chills, LE edema, dysuria, flank pain.    OBJECTIVE  PHYSICAL EXAM:  GENERAL: No acute distress, comfortably in bed, mild cough  HEAD: Atraumatic, normocephalic  ENT: PERRLA B/L, non-icteric sclera, no JVD  LUNGS: bronchial breath sounds present throughout b/l lung fields, no accessory muscle use  HEART: tachycardia, irregularly irregular rhythm, no murmurs appreciated  ABD: Soft, non-tender, non-distended, no organomegaly, +bs all 4 quadrants  EXTREMITIES: Delayed capillary refill, purple discoloration of toes, varicose veins present in b/l LE  SKIN: Warm, dry, no rashes present  NEURO: A&Ox3, no focal deficits, moving all extremities spontaneously, no dysarthria  PSYCH: Normal affect, calm     Vital Signs Last 24 Hrs  T(C): 36.5 (30 Apr 2025 07:16), Max: 36.7 (29 Apr 2025 15:39)  T(F): 97.7 (30 Apr 2025 07:16), Max: 98.1 (29 Apr 2025 15:39)  HR: 107 (30 Apr 2025 07:16) (107 - 150)  BP: 102/75 (30 Apr 2025 07:16) (102/75 - 134/83)  BP(mean): 98 (30 Apr 2025 05:45) (89 - 106)  RR: 17 (30 Apr 2025 07:16) (17 - 18)  SpO2: 95% (30 Apr 2025 07:16) (92% - 95%)    Parameters below as of 30 Apr 2025 07:16  Patient On (Oxygen Delivery Method): room air            MEDICATIONS  (STANDING):  apixaban 5 milliGRAM(s) Oral two times a day  atorvastatin 20 milliGRAM(s) Oral at bedtime  diltiazem    Tablet 30 milliGRAM(s) Oral four times a day  furosemide   Injectable 40 milliGRAM(s) IV Push daily  metoprolol succinate  milliGRAM(s) Oral daily  metoprolol succinate ER 25 milliGRAM(s) Oral at bedtime    MEDICATIONS  (PRN):    Allergies    amoxicillin (Unknown)    Intolerances        LABS:                        13.4   7.16  )-----------( 261      ( 30 Apr 2025 04:50 )             40.7     04-30    139  |  102  |  18.8  ----------------------------<  98  3.7   |  23.0  |  1.08    Ca    8.6      30 Apr 2025 04:50  Phos  3.1     04-30  Mg     1.8     04-30    TPro  6.3[L]  /  Alb  3.5  /  TBili  0.7  /  DBili  x   /  AST  20  /  ALT  12  /  AlkPhos  87  04-30    PT/INR - ( 29 Apr 2025 04:55 )   PT: 17.3 sec;   INR: 1.50 ratio         PTT - ( 29 Apr 2025 04:55 )  PTT:36.4 sec  Urinalysis Basic - ( 30 Apr 2025 04:50 )    Color: x / Appearance: x / SG: x / pH: x  Gluc: 98 mg/dL / Ketone: x  / Bili: x / Urobili: x   Blood: x / Protein: x / Nitrite: x   Leuk Esterase: x / RBC: x / WBC x   Sq Epi: x / Non Sq Epi: x / Bacteria: x      CAPILLARY BLOOD GLUCOSE          CULTURE DATA:      RADIOLOGY & ADDITIONAL TESTS:

## 2025-04-30 NOTE — PROGRESS NOTE ADULT - ATTENDING COMMENTS
89 year old male with Hypertension, Atrial Fibrillation and Dyslipidemia presented with palpitations and dyspnea.  Relates weeks long orthopnea, pedal edema, PND, Cough. No fever  EKG - AF with RVR. RVP (+) for hMPV BNP 1994  Diuresed in ER    Patient interviewed and examined.   Feels better    Vitals stable.  Elderly male walking from bathroom  Minimal increase to normal work of breathing  Coarse breath sounds  Tachycardiac S1 S2  Soft abdomen, umbilical hernia  No edema    TTE EF 55-60%    A1c 6.2    # AF with RVR, improved  # CHF, Diastolic. Improved  # hMPV infection  # HTN  # Dyslipidemia  - Monitor, Diltiazem, Apixaban  - I/O, daily weight, Fluid restriction  - No further diuresis  - Supportive care  - Statin  - Mobilize    Disposition - Pending clinical improvement; likely return to assisted living facility in next 24-48 hours

## 2025-05-01 LAB
ALBUMIN SERPL ELPH-MCNC: 3.5 G/DL — SIGNIFICANT CHANGE UP (ref 3.3–5.2)
ALP SERPL-CCNC: 87 U/L — SIGNIFICANT CHANGE UP (ref 40–120)
ALT FLD-CCNC: 14 U/L — SIGNIFICANT CHANGE UP
ANION GAP SERPL CALC-SCNC: 15 MMOL/L — SIGNIFICANT CHANGE UP (ref 5–17)
AST SERPL-CCNC: 21 U/L — SIGNIFICANT CHANGE UP
BASOPHILS # BLD AUTO: 0.03 K/UL — SIGNIFICANT CHANGE UP (ref 0–0.2)
BASOPHILS NFR BLD AUTO: 0.4 % — SIGNIFICANT CHANGE UP (ref 0–2)
BILIRUB SERPL-MCNC: 0.6 MG/DL — SIGNIFICANT CHANGE UP (ref 0.4–2)
BUN SERPL-MCNC: 23.2 MG/DL — HIGH (ref 8–20)
CALCIUM SERPL-MCNC: 8.4 MG/DL — SIGNIFICANT CHANGE UP (ref 8.4–10.5)
CHLORIDE SERPL-SCNC: 98 MMOL/L — SIGNIFICANT CHANGE UP (ref 96–108)
CO2 SERPL-SCNC: 23 MMOL/L — SIGNIFICANT CHANGE UP (ref 22–29)
CREAT SERPL-MCNC: 1.19 MG/DL — SIGNIFICANT CHANGE UP (ref 0.5–1.3)
CULTURE RESULTS: ABNORMAL
EGFR: 56 ML/MIN/1.73M2 — LOW
EGFR: 56 ML/MIN/1.73M2 — LOW
EOSINOPHIL # BLD AUTO: 0.16 K/UL — SIGNIFICANT CHANGE UP (ref 0–0.5)
EOSINOPHIL NFR BLD AUTO: 2.1 % — SIGNIFICANT CHANGE UP (ref 0–6)
GLUCOSE SERPL-MCNC: 90 MG/DL — SIGNIFICANT CHANGE UP (ref 70–99)
HCT VFR BLD CALC: 40.1 % — SIGNIFICANT CHANGE UP (ref 39–50)
HGB BLD-MCNC: 13.2 G/DL — SIGNIFICANT CHANGE UP (ref 13–17)
IMM GRANULOCYTES # BLD AUTO: 0.03 K/UL — SIGNIFICANT CHANGE UP (ref 0–0.07)
IMM GRANULOCYTES NFR BLD AUTO: 0.4 % — SIGNIFICANT CHANGE UP (ref 0–0.9)
LYMPHOCYTES # BLD AUTO: 1.63 K/UL — SIGNIFICANT CHANGE UP (ref 1–3.3)
LYMPHOCYTES NFR BLD AUTO: 21.3 % — SIGNIFICANT CHANGE UP (ref 13–44)
MAGNESIUM SERPL-MCNC: 1.8 MG/DL — SIGNIFICANT CHANGE UP (ref 1.6–2.6)
MCHC RBC-ENTMCNC: 31.5 PG — SIGNIFICANT CHANGE UP (ref 27–34)
MCHC RBC-ENTMCNC: 32.9 G/DL — SIGNIFICANT CHANGE UP (ref 32–36)
MCV RBC AUTO: 95.7 FL — SIGNIFICANT CHANGE UP (ref 80–100)
MONOCYTES # BLD AUTO: 0.72 K/UL — SIGNIFICANT CHANGE UP (ref 0–0.9)
MONOCYTES NFR BLD AUTO: 9.4 % — SIGNIFICANT CHANGE UP (ref 2–14)
NEUTROPHILS # BLD AUTO: 5.08 K/UL — SIGNIFICANT CHANGE UP (ref 1.8–7.4)
NEUTROPHILS NFR BLD AUTO: 66.4 % — SIGNIFICANT CHANGE UP (ref 43–77)
NRBC # BLD AUTO: 0 K/UL — SIGNIFICANT CHANGE UP (ref 0–0)
NRBC # FLD: 0 K/UL — SIGNIFICANT CHANGE UP (ref 0–0)
NRBC BLD AUTO-RTO: 0 /100 WBCS — SIGNIFICANT CHANGE UP (ref 0–0)
ORGANISM # SPEC MICROSCOPIC CNT: ABNORMAL
ORGANISM # SPEC MICROSCOPIC CNT: SIGNIFICANT CHANGE UP
PHOSPHATE SERPL-MCNC: 3.1 MG/DL — SIGNIFICANT CHANGE UP (ref 2.4–4.7)
PLATELET # BLD AUTO: 274 K/UL — SIGNIFICANT CHANGE UP (ref 150–400)
PMV BLD: 9.6 FL — SIGNIFICANT CHANGE UP (ref 7–13)
POTASSIUM SERPL-MCNC: 3.5 MMOL/L — SIGNIFICANT CHANGE UP (ref 3.5–5.3)
POTASSIUM SERPL-SCNC: 3.5 MMOL/L — SIGNIFICANT CHANGE UP (ref 3.5–5.3)
PROT SERPL-MCNC: 6.2 G/DL — LOW (ref 6.6–8.7)
RBC # BLD: 4.19 M/UL — LOW (ref 4.2–5.8)
RBC # FLD: 13.2 % — SIGNIFICANT CHANGE UP (ref 10.3–14.5)
SODIUM SERPL-SCNC: 136 MMOL/L — SIGNIFICANT CHANGE UP (ref 135–145)
SPECIMEN SOURCE: SIGNIFICANT CHANGE UP
WBC # BLD: 7.65 K/UL — SIGNIFICANT CHANGE UP (ref 3.8–10.5)
WBC # FLD AUTO: 7.65 K/UL — SIGNIFICANT CHANGE UP (ref 3.8–10.5)

## 2025-05-01 PROCEDURE — 99232 SBSQ HOSP IP/OBS MODERATE 35: CPT

## 2025-05-01 RX ADMIN — ATORVASTATIN CALCIUM 20 MILLIGRAM(S): 80 TABLET, FILM COATED ORAL at 21:10

## 2025-05-01 RX ADMIN — DILTIAZEM HYDROCHLORIDE 60 MILLIGRAM(S): 120 CAPSULE, EXTENDED RELEASE ORAL at 23:11

## 2025-05-01 RX ADMIN — DILTIAZEM HYDROCHLORIDE 60 MILLIGRAM(S): 120 CAPSULE, EXTENDED RELEASE ORAL at 11:32

## 2025-05-01 RX ADMIN — METOPROLOL SUCCINATE 25 MILLIGRAM(S): 50 TABLET, EXTENDED RELEASE ORAL at 21:10

## 2025-05-01 RX ADMIN — APIXABAN 5 MILLIGRAM(S): 2.5 TABLET, FILM COATED ORAL at 05:21

## 2025-05-01 RX ADMIN — DILTIAZEM HYDROCHLORIDE 60 MILLIGRAM(S): 120 CAPSULE, EXTENDED RELEASE ORAL at 17:07

## 2025-05-01 RX ADMIN — APIXABAN 5 MILLIGRAM(S): 2.5 TABLET, FILM COATED ORAL at 17:07

## 2025-05-01 RX ADMIN — DILTIAZEM HYDROCHLORIDE 60 MILLIGRAM(S): 120 CAPSULE, EXTENDED RELEASE ORAL at 05:21

## 2025-05-01 NOTE — PROGRESS NOTE ADULT - ASSESSMENT
89 y/o M w/ PMHx of AF (on eliquis), HTN, HLD, ex smoker, presented from [facility] (independent, moved there 2 months ago to live with his wife) c/o palpitations x 2 months that worsened overnight, associated with b/l LE edema, SOB, and 2 pillow orthopnea x 2 months. Now, pt is euvolemic, AF with RVR still under management.    Plan  #AF with RVR likely 2/2 hMPV  - s/p 60mg dilt po, total of 35mg IV dilt and 20mg IV lasix in ED  - Continue home diltiazem and metoprolol   - TTE showing normal EF, pulmonary HTN  - hMPV (+), offer supportive care   - CHADSVASC = 3 (but if add HF is 4)  - Continue eliquis   - Monitor on telemetry   - Cardio consulted ([facility])      #SIRS likely 2/2 hMPV  #AF RVR and possible new decompensated HF  # Pulmonary HTN   - Tachycardia, tachypneic on arrival but HR and RR improved   - Nontoxic, no leukocytosis or bandemia and is afebrile   - CXR w/ mild pulm vascular congestion appreciate and bibasilar atelectasis R>L and no consolidation/infiltrate noted on wet read; small left base retrocardiac infiltrate which is new since prior.  - Trops 23, repeat 23, and BNP 1994   - s/p 60mg diltiazem po, total of 35mg IV diltiazem and 20mg IV lasix in ED  - hold IVFs given concern for vol overloaded   - hypervolemic on admission, now euvolemic - discontinue IV diuresis, monitor lytes, renal function  - Check daily weights, strict I/O;s and fluid restrict   - repeat EKG  - TSH normal  - lipid panel abnormal, started on atorvastatin  - a1c 6.2  - Maintain K>4 and Mg>2  - Monitor on telemetry   - [facility]    #HTN / HLD  - c/w home diltiazem and metoprolol  - Started on atorvastatin      VTE ppx:  on eliquis     Dispo: Acute.  Anticipate d/c back to [facility] in 1-2 days pending clinical course.    89 y/o M w/ PMHx of AF (on eliquis), HTN, HLD, ex smoker, presented from [facility] (independent, moved there 2 months ago to live with his wife) c/o palpitations x 2 months that worsened overnight, associated with b/l LE edema, SOB, and 2 pillow orthopnea x 2 months. Now, pt is euvolemic, AF with RVR still under management.    Plan  #AF with RVR likely 2/2 hMPV  - s/p 60mg dilt po, total of 35mg IV dilt and 20mg IV lasix in ED  - Continue diltiazem and metoprolol   - TTE showing normal EF, pulmonary HTN  - hMPV (+), offer supportive care   - CHADSVASC = 3 (but if add HF is 4)  - Continue eliquis   - Monitor on telemetry   - Cardio consulted ([facility])      #SIRS likely 2/2 hMPV  #AF RVR and possible new decompensated HF  # Pulmonary HTN   - Tachycardia, tachypneic on arrival but HR and RR improved   - Nontoxic, no leukocytosis or bandemia and is afebrile   - CXR w/ mild pulm vascular congestion appreciate and bibasilar atelectasis R>L and no consolidation/infiltrate noted on wet read; small left base retrocardiac infiltrate which is new since prior.  - Trops 23, repeat 23, and BNP 1994   - s/p 60mg diltiazem po, total of 35mg IV diltiazem and 20mg IV lasix in ED  - hold IVFs given concern for vol overloaded   - hypervolemic on admission, now euvolemic - discontinue IV diuresis, monitor lytes, renal function  - Check daily weights, strict I/O;s and fluid restrict   - repeat EKG  - TSH normal  - lipid panel abnormal, started on atorvastatin  - a1c 6.2  - Maintain K>4 and Mg>2  - Monitor on telemetry   - Cardiology consulted ([facility] as pt follows w/ [facility])    #HTN / HLD  - c/w diltiazem and metoprolol  - Started on atorvastatin      VTE ppx:  on eliquis     Dispo: Acute.  Anticipate d/c back to [facility] tomorrow

## 2025-05-01 NOTE — PROGRESS NOTE ADULT - SUBJECTIVE AND OBJECTIVE BOX
Patient is a 89y old  Male who presents with a chief complaint of AF (01 May 2025 08:16)      INTERVAL HPI/OVERNIGHT EVENTS:  - No acute overnight events    TODAY:  - Patient examined bedside, no complaints. Patient denies CP, SOB, fever, nausea, vomiting    MEDICATIONS  (STANDING):  apixaban 5 milliGRAM(s) Oral two times a day  atorvastatin 20 milliGRAM(s) Oral at bedtime  diltiazem    Tablet 60 milliGRAM(s) Oral every 6 hours  metoprolol succinate  milliGRAM(s) Oral daily  metoprolol succinate ER 25 milliGRAM(s) Oral at bedtime    MEDICATIONS  (PRN):  metoprolol tartrate Injectable 2.5 milliGRAM(s) IV Push every 6 hours PRN hr>120      Allergies    amoxicillin (Unknown)    Intolerances        REVIEW OF SYSTEMS:  as above      Vital Signs Last 24 Hrs  T(C): 36.4 (01 May 2025 10:10), Max: 36.4 (01 May 2025 04:57)  T(F): 97.5 (01 May 2025 10:10), Max: 97.5 (01 May 2025 04:57)  HR: 74 (01 May 2025 10:10) (74 - 98)  BP: 105/69 (01 May 2025 10:10) (100/54 - 141/88)  BP(mean): --  RR: 18 (01 May 2025 10:10) (18 - 19)  SpO2: 95% (01 May 2025 10:10) (95% - 99%)    Parameters below as of 01 May 2025 10:10  Patient On (Oxygen Delivery Method): room air        PHYSICAL EXAM:  GENERAL: Not in acute distress, lying comfortably  HEAD:  Atraumatic, Normocephalic  EYES: EOMI, PERRLA, conjunctiva and sclera clear  NECK: Supple, No JVD, Normal thyroid  NERVOUS SYSTEM:  Alert & Oriented X3, No gross focal deficits  CHEST/LUNG: Clear to auscultation bilaterally; No rales, rhonchi, wheezing, or rubs  HEART: Regular rate and rhythm; No murmurs, rubs, or gallops  ABDOMEN: Soft, Nontender, Nondistended; Bowel sounds present  EXTREMITIES:  No clubbing, cyanosis, or edema  SKIN: No rashes or lesions    LABS:                        13.2   7.65  )-----------( 274      ( 01 May 2025 04:45 )             40.1     05-01    136  |  98  |  23.2[H]  ----------------------------<  90  3.5   |  23.0  |  1.19    Ca    8.4      01 May 2025 04:45  Phos  3.1     05-01  Mg     1.8     05-01    TPro  6.2[L]  /  Alb  3.5  /  TBili  0.6  /  DBili  x   /  AST  21  /  ALT  14  /  AlkPhos  87  05-01      Urinalysis Basic - ( 01 May 2025 04:45 )    Color: x / Appearance: x / SG: x / pH: x  Gluc: 90 mg/dL / Ketone: x  / Bili: x / Urobili: x   Blood: x / Protein: x / Nitrite: x   Leuk Esterase: x / RBC: x / WBC x   Sq Epi: x / Non Sq Epi: x / Bacteria: x      CAPILLARY BLOOD GLUCOSE          RADIOLOGY & ADDITIONAL TESTS:    Imaging Personally Reviewed:  [X] YES  [ ] NO    Consultant(s) Notes Reviewed:  [X] YES  [ ] NO    Care Discussed with Consultants/Other Providers [X] YES  [ ] NO    Plan of Care discussed with House Staff: [X]YES [ ] NO Patient is a 89y old  Male who presents with a chief complaint of AF (01 May 2025 08:16)      INTERVAL HPI/OVERNIGHT EVENTS:  - No acute overnight events    TODAY:  - Patient examined bedside, no complaints. Patient denies CP, SOB, fever, nausea, vomiting    MEDICATIONS  (STANDING):  apixaban 5 milliGRAM(s) Oral two times a day  atorvastatin 20 milliGRAM(s) Oral at bedtime  diltiazem    Tablet 60 milliGRAM(s) Oral every 6 hours  metoprolol succinate  milliGRAM(s) Oral daily  metoprolol succinate ER 25 milliGRAM(s) Oral at bedtime    MEDICATIONS  (PRN):  metoprolol tartrate Injectable 2.5 milliGRAM(s) IV Push every 6 hours PRN hr>120      Allergies    amoxicillin (Unknown)    Intolerances        REVIEW OF SYSTEMS:  as above      Vital Signs Last 24 Hrs  T(C): 36.4 (01 May 2025 10:10), Max: 36.4 (01 May 2025 04:57)  T(F): 97.5 (01 May 2025 10:10), Max: 97.5 (01 May 2025 04:57)  HR: 74 (01 May 2025 10:10) (74 - 98)  BP: 105/69 (01 May 2025 10:10) (100/54 - 141/88)  BP(mean): --  RR: 18 (01 May 2025 10:10) (18 - 19)  SpO2: 95% (01 May 2025 10:10) (95% - 99%)    Parameters below as of 01 May 2025 10:10  Patient On (Oxygen Delivery Method): room air        PHYSICAL EXAM:  GENERAL: Not in acute distress, lying comfortably  HEAD:  Atraumatic, Normocephalic  EYES: EOMI, PERRLA, conjunctiva and sclera clear  NECK: Supple, No JVD, Normal thyroid  NERVOUS SYSTEM:  Alert & Oriented X3, No gross focal deficits  CHEST/LUNG: Clear to auscultation bilaterally; No rales, rhonchi, wheezing, or rubs  HEART:  irregularly irregular rate/rhythm, normal S1 and S2, No murmurs, rubs, or gallops  ABDOMEN: Soft, Nontender, Nondistended; Bowel sounds present  EXTREMITIES:  No clubbing, cyanosis, or edema  SKIN: No rashes or lesions    LABS:                        13.2   7.65  )-----------( 274      ( 01 May 2025 04:45 )             40.1     05-01    136  |  98  |  23.2[H]  ----------------------------<  90  3.5   |  23.0  |  1.19    Ca    8.4      01 May 2025 04:45  Phos  3.1     05-01  Mg     1.8     05-01    TPro  6.2[L]  /  Alb  3.5  /  TBili  0.6  /  DBili  x   /  AST  21  /  ALT  14  /  AlkPhos  87  05-01      Urinalysis Basic - ( 01 May 2025 04:45 )    Color: x / Appearance: x / SG: x / pH: x  Gluc: 90 mg/dL / Ketone: x  / Bili: x / Urobili: x   Blood: x / Protein: x / Nitrite: x   Leuk Esterase: x / RBC: x / WBC x   Sq Epi: x / Non Sq Epi: x / Bacteria: x      CAPILLARY BLOOD GLUCOSE          RADIOLOGY & ADDITIONAL TESTS:    Imaging Personally Reviewed:  [X] YES  [ ] NO    Consultant(s) Notes Reviewed:  [X] YES  [ ] NO    Care Discussed with Consultants/Other Providers [X] YES  [ ] NO    Plan of Care discussed with House Staff: [X]YES [ ] NO

## 2025-05-01 NOTE — PROGRESS NOTE ADULT - SUBJECTIVE AND OBJECTIVE BOX
LIBBY MENDOZAN  [id number]        Chief Complaint: AF/flutter/ CHF      Subjective: no complaints      24 hour Tele: AF        apixaban 5 milliGRAM(s) Oral two times a day  atorvastatin 20 milliGRAM(s) Oral at bedtime  diltiazem    Tablet 60 milliGRAM(s) Oral every 6 hours  furosemide    Tablet 20 milliGRAM(s) Oral daily  metoprolol succinate  milliGRAM(s) Oral daily  metoprolol succinate ER 25 milliGRAM(s) Oral at bedtime  metoprolol tartrate Injectable 2.5 milliGRAM(s) IV Push every 6 hours PRN          Physical Exam:  T(C): 36.4 (05-01-25 @ 04:57), Max: 36.4 (05-01-25 @ 04:57)  HR: 85 (05-01-25 @ 04:57) (80 - 125)  BP: 100/54 (05-01-25 @ 04:57) (100/54 - 141/88)  RR: 19 (05-01-25 @ 04:57) (18 - 19)  SpO2: 95% (05-01-25 @ 04:57) (95% - 99%)  Wt(kg): --  PHYSICAL EXAM:  General: No acute distress  HEENT: scera anicteric  Neck:  supple   Lungs: Clear to auscultation bilaterally; No rales or wheezing  Heart: irregular rate and rhythm; No murmurs, rubs, or gallops  Abdomen: soft, non tender, non distended   Extremities: warm, 1+edema  Nervous system:  Alert & Oriented X3  Psychiatric: Normal affect        I&O's Summary    30 Apr 2025 07:01  -  01 May 2025 07:00  --------------------------------------------------------  IN: 250 mL / OUT: 0 mL / NET: 250 mL          Labs:   01 May 2025 04:45    136    |  98     |  23.2   ----------------------------<  90     3.5     |  23.0   |  1.19     Ca    8.4        01 May 2025 04:45  Phos  3.1       01 May 2025 04:45  Mg     1.8       01 May 2025 04:45    TPro  6.2    /  Alb  3.5    /  TBili  0.6    /  DBili  x      /  AST  21     /  ALT  14     /  AlkPhos  87     01 May 2025 04:45                          13.2   7.65  )-----------( 274      ( 01 May 2025 04:45 )             40.1         Echo:   1. Left ventricular systolic function is normal with an ejection fraction visually estimated at 55 to 60 %.   2. Left atrium is normal in size.   3. Estimated pulmonary artery systolic pressure is 25 mmHg.        Assessment:  89 y/o M w/ PMH of CAD,  Atrial Flutter s/p CV (on eliquis), HTN, HLD presented from [facility] c/o palpitations that started over night while at rest.  Pt states that he felt his heart was coming out of his chest so he came to hospital.  He also reports both of his legs have been swelling for the past 2 months.  He denies sob/hebert, orthopnea, CP, palpitations, abd pain, N/V, diarrhea.  He also denies recent cold/sick contacts, recent travel, fevers, chills, dysuria.  VS in the ED significant for tachycardia/AF w/ rates in 150's now in low 100's and RR in high 20's now nL and satting well ORA.  Clinically nontoxic appearing but has irregularly irregular rate/rhythm, diffuse bibasilar rales and 1+ pretibial/pedal pitting edema.  Initial w/u significant for trop 23 x1, BNP 1994, EKG w/ AF rvr but no acute ischemic changes appreciated. CXR w/ mild pulm vascular congestion appreciate and bibasilar atelectatics R>L appreciated on wet read.  s/p 60mg dilt po, total of 35mg IV dilt and 20mg IV lasix in ED.  Will admit for AF RVR and possible new onset diastolic CHF.  -Echo with preserved EF and no significant structural dz.  -Now with hMPV, likely contributing to RVR.  -Appears more euvolemic on exam.    Plan : (TO BE SEEN)   1.   2. Continue Metoprolol IV PRN as well.  3. Lasix 40mg IV QD, monitor renal function and electrolytes.  Likely change to po tomorrow.  4. Continue tele monitoring.  5. A/C with Eliquis.   6. hMPV per primary team.  7. As hMPV clears and if still RVR may have to consider DCCV, rate control strategy for now.        Javi Cao MD LIBBY MENDOZAN  [id number]        Chief Complaint: AF/flutter/ CHF      Subjective: no complaints      24 hour Tele: AF rate controlled         apixaban 5 milliGRAM(s) Oral two times a day  atorvastatin 20 milliGRAM(s) Oral at bedtime  diltiazem    Tablet 60 milliGRAM(s) Oral every 6 hours  furosemide    Tablet 20 milliGRAM(s) Oral daily  metoprolol succinate  milliGRAM(s) Oral daily  metoprolol succinate ER 25 milliGRAM(s) Oral at bedtime  metoprolol tartrate Injectable 2.5 milliGRAM(s) IV Push every 6 hours PRN          Physical Exam:  T(C): 36.4 (05-01-25 @ 04:57), Max: 36.4 (05-01-25 @ 04:57)  HR: 85 (05-01-25 @ 04:57) (80 - 125)  BP: 100/54 (05-01-25 @ 04:57) (100/54 - 141/88)  RR: 19 (05-01-25 @ 04:57) (18 - 19)  SpO2: 95% (05-01-25 @ 04:57) (95% - 99%)  Wt(kg): --  PHYSICAL EXAM:  General: No acute distress  HEENT: scera anicteric  Neck:  supple   Lungs: course, scattered rhonchi   Heart: irregular rate and rhythm; No murmurs, rubs, or gallops  Abdomen: soft, non tender, non distended   Extremities: warm,trace edema   Nervous system:  Alert & Oriented X3  Psychiatric: Normal affect        I&O's Summary    30 Apr 2025 07:01  -  01 May 2025 07:00  --------------------------------------------------------  IN: 250 mL / OUT: 0 mL / NET: 250 mL          Labs:   01 May 2025 04:45    136    |  98     |  23.2   ----------------------------<  90     3.5     |  23.0   |  1.19     Ca    8.4        01 May 2025 04:45  Phos  3.1       01 May 2025 04:45  Mg     1.8       01 May 2025 04:45    TPro  6.2    /  Alb  3.5    /  TBili  0.6    /  DBili  x      /  AST  21     /  ALT  14     /  AlkPhos  87     01 May 2025 04:45                          13.2   7.65  )-----------( 274      ( 01 May 2025 04:45 )             40.1         Echo:   1. Left ventricular systolic function is normal with an ejection fraction visually estimated at 55 to 60 %.   2. Left atrium is normal in size.   3. Estimated pulmonary artery systolic pressure is 25 mmHg.        Assessment:  89 y/o M w/ PMH of CAD,  Atrial Flutter s/p CV (on eliquis), HTN, HLD presented from [facility] c/o palpitations that started over night while at rest.  Pt states that he felt his heart was coming out of his chest so he came to hospital.  He also reports both of his legs have been swelling for the past 2 months.  He denies sob/hebert, orthopnea, CP, palpitations, abd pain, N/V, diarrhea.  He also denies recent cold/sick contacts, recent travel, fevers, chills, dysuria.  VS in the ED significant for tachycardia/AF w/ rates in 150's now in low 100's and RR in high 20's now nL and satting well ORA.  Clinically nontoxic appearing but has irregularly irregular rate/rhythm, diffuse bibasilar rales and 1+ pretibial/pedal pitting edema.  Initial w/u significant for trop 23 x1, BNP 1994, EKG w/ AF rvr but no acute ischemic changes appreciated. CXR w/ mild pulm vascular congestion appreciate and bibasilar atelectatics R>L appreciated on wet read.  s/p 60mg dilt po, total of 35mg IV dilt and 20mg IV lasix in ED.  Will admit for AF RVR and possible new onset diastolic CHF.  -Echo with preserved EF and no significant structural dz.  -Now with hMPV, likely contributing to RVR.  -Appears more euvolemic on exam , BP a bit low today as well     Plan :  1. Hold lasix today, discharge on 20mg daily for 5 days then re-assess as outpatient  2. Continue metoprolol/cardizem, would change cardizem to long acting at discharge as well (180mg daily)   3. Continue tele monitoring.  4. A/C with Eliquis.   5. hMPV per primary team.  6. Office follow up with [facility] within 1 week dischage  7. DC planning from cardiac standpoint       Javi Cao MD

## 2025-05-01 NOTE — PROGRESS NOTE ADULT - ATTENDING COMMENTS
89 year old male with Hypertension, Atrial Fibrillation and Dyslipidemia presented with palpitations and dyspnea.  Relates weeks long orthopnea, pedal edema, PND, Cough. No fever  EKG - AF with RVR. RVP (+) for hMPV BNP 1994  Diuresed in ER    Patient interviewed and examined.   Feels better. No more palpitations or dyspnea    Vitals stable.  Elderly male sitting in chair, comfortable  Minimal increase to normal work of breathing  Coarse breath sounds  Tachycardiac S1 S2  Soft abdomen, umbilical hernia  No edema    TTE EF 55-60%    A1c 6.2    # AF with RVR, improved  # CHF, Diastolic. Improved  # hMPV infection  # HTN  # Dyslipidemia  - Monitor, Diltiazem, Apixaban  - I/O, daily weight, Fluid restriction  - No further diuresis for now, re-evaluate in am  - Supportive care  - Statin  - Mobilize    Disposition - Pending clinical improvement; likely return to assisted living facility in next 24-48 hours

## 2025-05-02 ENCOUNTER — TRANSCRIPTION ENCOUNTER (OUTPATIENT)
Age: 89
End: 2025-05-02

## 2025-05-02 VITALS
RESPIRATION RATE: 18 BRPM | OXYGEN SATURATION: 96 % | HEART RATE: 60 BPM | DIASTOLIC BLOOD PRESSURE: 47 MMHG | TEMPERATURE: 98 F | SYSTOLIC BLOOD PRESSURE: 123 MMHG

## 2025-05-02 LAB
ALBUMIN SERPL ELPH-MCNC: 3.5 G/DL — SIGNIFICANT CHANGE UP (ref 3.3–5.2)
ALP SERPL-CCNC: 90 U/L — SIGNIFICANT CHANGE UP (ref 40–120)
ALT FLD-CCNC: 15 U/L — SIGNIFICANT CHANGE UP
ANION GAP SERPL CALC-SCNC: 14 MMOL/L — SIGNIFICANT CHANGE UP (ref 5–17)
AST SERPL-CCNC: 23 U/L — SIGNIFICANT CHANGE UP
BASOPHILS # BLD AUTO: 0.03 K/UL — SIGNIFICANT CHANGE UP (ref 0–0.2)
BASOPHILS NFR BLD AUTO: 0.4 % — SIGNIFICANT CHANGE UP (ref 0–2)
BILIRUB SERPL-MCNC: 0.6 MG/DL — SIGNIFICANT CHANGE UP (ref 0.4–2)
BUN SERPL-MCNC: 25.6 MG/DL — HIGH (ref 8–20)
CALCIUM SERPL-MCNC: 8.4 MG/DL — SIGNIFICANT CHANGE UP (ref 8.4–10.5)
CHLORIDE SERPL-SCNC: 99 MMOL/L — SIGNIFICANT CHANGE UP (ref 96–108)
CO2 SERPL-SCNC: 23 MMOL/L — SIGNIFICANT CHANGE UP (ref 22–29)
CREAT SERPL-MCNC: 1.21 MG/DL — SIGNIFICANT CHANGE UP (ref 0.5–1.3)
EGFR: 55 ML/MIN/1.73M2 — LOW
EGFR: 55 ML/MIN/1.73M2 — LOW
EOSINOPHIL # BLD AUTO: 0.14 K/UL — SIGNIFICANT CHANGE UP (ref 0–0.5)
EOSINOPHIL NFR BLD AUTO: 2 % — SIGNIFICANT CHANGE UP (ref 0–6)
GLUCOSE SERPL-MCNC: 88 MG/DL — SIGNIFICANT CHANGE UP (ref 70–99)
HCT VFR BLD CALC: 41 % — SIGNIFICANT CHANGE UP (ref 39–50)
HGB BLD-MCNC: 13.7 G/DL — SIGNIFICANT CHANGE UP (ref 13–17)
IMM GRANULOCYTES # BLD AUTO: 0.03 K/UL — SIGNIFICANT CHANGE UP (ref 0–0.07)
IMM GRANULOCYTES NFR BLD AUTO: 0.4 % — SIGNIFICANT CHANGE UP (ref 0–0.9)
LYMPHOCYTES # BLD AUTO: 1.25 K/UL — SIGNIFICANT CHANGE UP (ref 1–3.3)
LYMPHOCYTES NFR BLD AUTO: 18.2 % — SIGNIFICANT CHANGE UP (ref 13–44)
MAGNESIUM SERPL-MCNC: 2 MG/DL — SIGNIFICANT CHANGE UP (ref 1.6–2.6)
MCHC RBC-ENTMCNC: 31.6 PG — SIGNIFICANT CHANGE UP (ref 27–34)
MCHC RBC-ENTMCNC: 33.4 G/DL — SIGNIFICANT CHANGE UP (ref 32–36)
MCV RBC AUTO: 94.5 FL — SIGNIFICANT CHANGE UP (ref 80–100)
MONOCYTES # BLD AUTO: 0.63 K/UL — SIGNIFICANT CHANGE UP (ref 0–0.9)
MONOCYTES NFR BLD AUTO: 9.2 % — SIGNIFICANT CHANGE UP (ref 2–14)
NEUTROPHILS # BLD AUTO: 4.77 K/UL — SIGNIFICANT CHANGE UP (ref 1.8–7.4)
NEUTROPHILS NFR BLD AUTO: 69.8 % — SIGNIFICANT CHANGE UP (ref 43–77)
NRBC # BLD AUTO: 0 K/UL — SIGNIFICANT CHANGE UP (ref 0–0)
NRBC # FLD: 0 K/UL — SIGNIFICANT CHANGE UP (ref 0–0)
NRBC BLD AUTO-RTO: 0 /100 WBCS — SIGNIFICANT CHANGE UP (ref 0–0)
PHOSPHATE SERPL-MCNC: 3.3 MG/DL — SIGNIFICANT CHANGE UP (ref 2.4–4.7)
PLATELET # BLD AUTO: 295 K/UL — SIGNIFICANT CHANGE UP (ref 150–400)
PMV BLD: 9.7 FL — SIGNIFICANT CHANGE UP (ref 7–13)
POTASSIUM SERPL-MCNC: 3.9 MMOL/L — SIGNIFICANT CHANGE UP (ref 3.5–5.3)
POTASSIUM SERPL-SCNC: 3.9 MMOL/L — SIGNIFICANT CHANGE UP (ref 3.5–5.3)
PROT SERPL-MCNC: 6.5 G/DL — LOW (ref 6.6–8.7)
RBC # BLD: 4.34 M/UL — SIGNIFICANT CHANGE UP (ref 4.2–5.8)
RBC # FLD: 12.9 % — SIGNIFICANT CHANGE UP (ref 10.3–14.5)
SODIUM SERPL-SCNC: 136 MMOL/L — SIGNIFICANT CHANGE UP (ref 135–145)
WBC # BLD: 6.85 K/UL — SIGNIFICANT CHANGE UP (ref 3.8–10.5)
WBC # FLD AUTO: 6.85 K/UL — SIGNIFICANT CHANGE UP (ref 3.8–10.5)

## 2025-05-02 PROCEDURE — 99239 HOSP IP/OBS DSCHRG MGMT >30: CPT

## 2025-05-02 RX ORDER — METOPROLOL SUCCINATE 50 MG/1
1 TABLET, EXTENDED RELEASE ORAL
Qty: 30 | Refills: 2
Start: 2025-05-02 | End: 2025-07-30

## 2025-05-02 RX ORDER — ATORVASTATIN CALCIUM 80 MG/1
1 TABLET, FILM COATED ORAL
Qty: 30 | Refills: 2
Start: 2025-05-02 | End: 2025-07-30

## 2025-05-02 RX ORDER — FUROSEMIDE 10 MG/ML
1 INJECTION INTRAMUSCULAR; INTRAVENOUS
Qty: 5 | Refills: 0
Start: 2025-05-02 | End: 2025-05-06

## 2025-05-02 RX ORDER — DILTIAZEM HYDROCHLORIDE 120 MG/1
1 CAPSULE, EXTENDED RELEASE ORAL
Qty: 30 | Refills: 0
Start: 2025-05-02 | End: 2025-05-31

## 2025-05-02 RX ORDER — NAPROXEN SODIUM 275 MG
1 TABLET ORAL
Refills: 0 | DISCHARGE

## 2025-05-02 RX ORDER — DILTIAZEM HYDROCHLORIDE 120 MG/1
1 CAPSULE, EXTENDED RELEASE ORAL
Refills: 0 | DISCHARGE

## 2025-05-02 RX ORDER — APIXABAN 2.5 MG/1
1 TABLET, FILM COATED ORAL
Qty: 60 | Refills: 0
Start: 2025-05-02 | End: 2025-05-31

## 2025-05-02 RX ORDER — METOPROLOL SUCCINATE 50 MG/1
1 TABLET, EXTENDED RELEASE ORAL
Qty: 0 | Refills: 0 | DISCHARGE
Start: 2025-05-02

## 2025-05-02 RX ORDER — ALBUTEROL SULFATE 2.5 MG/3ML
2 VIAL, NEBULIZER (ML) INHALATION
Refills: 0 | DISCHARGE

## 2025-05-02 RX ORDER — APIXABAN 2.5 MG/1
1 TABLET, FILM COATED ORAL
Qty: 0 | Refills: 0 | DISCHARGE
Start: 2025-05-02

## 2025-05-02 RX ORDER — APIXABAN 2.5 MG/1
1 TABLET, FILM COATED ORAL
Refills: 0 | DISCHARGE

## 2025-05-02 RX ORDER — METOPROLOL SUCCINATE 50 MG/1
1 TABLET, EXTENDED RELEASE ORAL
Refills: 0 | DISCHARGE

## 2025-05-02 RX ADMIN — METOPROLOL SUCCINATE 100 MILLIGRAM(S): 50 TABLET, EXTENDED RELEASE ORAL at 05:36

## 2025-05-02 RX ADMIN — APIXABAN 5 MILLIGRAM(S): 2.5 TABLET, FILM COATED ORAL at 05:36

## 2025-05-02 RX ADMIN — DILTIAZEM HYDROCHLORIDE 60 MILLIGRAM(S): 120 CAPSULE, EXTENDED RELEASE ORAL at 05:36

## 2025-05-02 RX ADMIN — DILTIAZEM HYDROCHLORIDE 60 MILLIGRAM(S): 120 CAPSULE, EXTENDED RELEASE ORAL at 11:34

## 2025-05-02 NOTE — DISCHARGE NOTE NURSING/CASE MANAGEMENT/SOCIAL WORK - PATIENT PORTAL LINK FT
You can access the FollowMyHealth Patient Portal offered by [facility] by registering at the following website: [email or web address]. By joining [email or web address] FollowMyHealth portal, you will also be able to view your health information using other applications (apps) compatible with our system.

## 2025-05-02 NOTE — DISCHARGE NOTE PROVIDER - CARE PROVIDERS DIRECT ADDRESSES
[email or web address]

## 2025-05-02 NOTE — DISCHARGE NOTE NURSING/CASE MANAGEMENT/SOCIAL WORK - NSDCPEFALRISK_GEN_ALL_CORE
For information on Fall & Injury Prevention, visit: [email or web address] OR  [email or web address] OR  https://www.cdc.gov/steadi/patient.html

## 2025-05-02 NOTE — DISCHARGE NOTE NURSING/CASE MANAGEMENT/SOCIAL WORK - NSDCFUADDAPPT_GEN_ALL_CORE_FT
[facility] - [address] [place] [address]  Phone: [phone]	    APPTS ARE READY TO BE MADE: [X] YES    Best Family or Patient Contact (if needed):    Additional Information about above appointments (if needed):    1: [facility]  Javi Cao MD in 1 week   2: PCP in 1 week   3:     Other comments or requests:

## 2025-05-02 NOTE — DISCHARGE NOTE PROVIDER - NSDCFUADDAPPT_GEN_ALL_CORE_FT
APPTS ARE READY TO BE MADE: [X] YES    Best Family or Patient Contact (if needed):    Additional Information about above appointments (if needed):    1: [place] Javi Moore MD in 1 week   2: PCP in 1 week   3:     Other comments or requests:    APPTS ARE READY TO BE MADE: [X] YES    Best Family or Patient Contact (if needed):    Additional Information about above appointments (if needed):    1: [facility]  Javi Cao MD in 1 week   2: PCP in 1 week   3:     Other comments or requests:   Patient is being transferred. Caregiver will arrange follow up.

## 2025-05-02 NOTE — DISCHARGE NOTE PROVIDER - HOSPITAL COURSE
89 y/o M w/ PMHx of AF (on eliquis), HTN, HLD, ex smoker, presented from [facility] (independent, moved there 2 months ago to live with his wife) c/o palpitations,sob, b/l LE edema x 2 months that worsened overnight admitted for Afib RVR and possible new HF. After admission, labs show elevated BNP, workup did not show acuye ischemic change. CXR showed mild pulm congestion, bibasilar atelectatics. Cardiology following, Afib controled by calcium channel blokcer and beta blocker. Pt was found hMPV positive, on isolation. Lipid panel showed elevated LDL and decreased HDL, started on statin. TSH normal. TTE results showed EF 55 to 60%, normal left atrium size, pulmonary artery pressure of 25 mmHg. Pt s/p diuresis, pt medically improved, will dc with cardiology outpt follow up     # AF with RVR, improved  # CHF, Diastolic. Improved  # hMPV infection  # HTN  # Dyslipidemia

## 2025-05-02 NOTE — DISCHARGE NOTE NURSING/CASE MANAGEMENT/SOCIAL WORK - FINANCIAL ASSISTANCE
[facility] provides services at a reduced cost to those who are determined to be eligible through [facility]’s financial assistance program. Information regarding [facility]’s financial assistance program can be found by going to [email or web address] or by calling [phone].

## 2025-05-02 NOTE — DISCHARGE NOTE PROVIDER - ATTENDING DISCHARGE PHYSICAL EXAMINATION:
Vital Signs Last 24 Hrs  T(C): 36.3 (02 May 2025 05:00), Max: 36.4 (01 May 2025 10:10)  T(F): 97.4 (02 May 2025 05:00), Max: 97.5 (01 May 2025 10:10)  HR: 85 (02 May 2025 05:00) (74 - 95)  BP: 118/82 (02 May 2025 05:00) (100/66 - 122/81)  BP(mean): --  RR: 18 (02 May 2025 05:00) (18 - 18)  SpO2: 95% (02 May 2025 05:00) (94% - 98%)    Parameters below as of 02 May 2025 05:00  Patient On (Oxygen Delivery Method): room air  PHYSICAL EXAM:  GENERAL: Not in acute distress, lying comfortably  HEAD:  Atraumatic, Normocephalic  EYES: EOMI, PERRLA, conjunctiva and sclera clear  NECK: Supple, No JVD, Normal thyroid  NERVOUS SYSTEM:  Alert & Oriented X3, No gross focal deficits  CHEST/LUNG: Clear to auscultation bilaterally; No rales, rhonchi, wheezing, or rubs  HEART:  irregularly irregular rate/rhythm, normal S1 and S2, No murmurs, rubs, or gallops  ABDOMEN: Soft, Nontender, Nondistended; Bowel sounds present  EXTREMITIES:  No clubbing, cyanosis, or edema  SKIN: No rashes or lesions

## 2025-05-02 NOTE — DISCHARGE NOTE PROVIDER - PROVIDER TOKENS
PROVIDER:[TOKEN:[[id number]],FOLLOWUP:[1 week],ESTABLISHEDPATIENT:[T]],FREE:[LAST:[PCP],PHONE:[(   )    -],FAX:[(   )    -],FOLLOWUP:[1 week]]

## 2025-05-02 NOTE — DISCHARGE NOTE PROVIDER - NSDCCPCAREPLAN_GEN_ALL_CORE_FT
PRINCIPAL DISCHARGE DIAGNOSIS  Diagnosis: Atrial fibrillation with RVR  Assessment and Plan of Treatment: - Continue eliquis/metoprolol/cardizem      SECONDARY DISCHARGE DIAGNOSES  Diagnosis: Chronic diastolic congestive heart failure  Assessment and Plan of Treatment: - Continue lasix 20mg daily for 5 days   - Follow up Cardiology Dr. Javi Cao outpt in 1 week    Diagnosis: Infection due to human metapneumovirus (hMPV)  Assessment and Plan of Treatment: resolved    Diagnosis: HTN (hypertension)  Assessment and Plan of Treatment: continue cardizem    Diagnosis: Dyslipidemia  Assessment and Plan of Treatment: continue statin

## 2025-05-02 NOTE — DISCHARGE NOTE PROVIDER - CARE PROVIDER_API CALL
Javi Cao J  Cardiovascular Disease  [address] [place] [address]  Phone: [phone]  Fax: [phone]  Established Patient  Follow Up Time: 1 week    PCP,   Phone: (   )    -  Fax: (   )    -  Follow Up Time: 1 week

## 2025-05-02 NOTE — DISCHARGE NOTE PROVIDER - NSDCMRMEDTOKEN_GEN_ALL_CORE_FT
apixaban 5 mg oral tablet: 1 tab(s) orally 2 times a day  Cardizem  mg/24 hours oral capsule, extended release: 1 cap(s) orally once a day hold sbp &lt;100, hr &lt;60  Lasix 20 mg oral tablet: 1 tab(s) orally once a day hold sbp &lt;100, hr &lt;60  Lipitor 20 mg oral tablet: 1 tab(s) orally once a day (at bedtime)  metoprolol succinate 100 mg oral tablet, extended release: 1 tab(s) orally once a day hold sbp &lt;100, hr &lt;60  metoprolol succinate 25 mg oral tablet, extended release: 1 tab(s) orally once a day (at bedtime) hold sbp &lt;100, hr &lt;60   Cardizem  mg/24 hours oral capsule, extended release: 1 cap(s) orally once a day hold sbp &lt;100, hr &lt;60  Eliquis 5 mg oral tablet: 1 tab(s) orally 2 times a day  Lasix 20 mg oral tablet: 1 tab(s) orally once a day hold sbp &lt;100, hr &lt;60  Lipitor 20 mg oral tablet: 1 tab(s) orally once a day (at bedtime)  metoprolol succinate 100 mg oral tablet, extended release: 1 tab(s) orally once a day hold sbp &lt;100, hr &lt;60  metoprolol succinate 25 mg oral tablet, extended release: 1 tab(s) orally once a day (at bedtime) hold sbp &lt;100, hr &lt;60

## 2025-05-05 ENCOUNTER — NON-APPOINTMENT (OUTPATIENT)
Age: 89
End: 2025-05-05

## 2025-05-07 NOTE — CDI QUERY NOTE - NSCDIOTHERTXTBX_GEN_ALL_CORE_HH
Clinical documentation and/or evidence in the medical record indicates that this patient has CHF.  In order to ensure accurate coding and accuracy of the clinical record, the documentation in this patient’s medical record requires additional clarification.      Please include more specific documentation as to the type of CHF in addendum to discharge summary.      Specify Acuity:  •	Acute   •	Chronic  •	Acute on Chronic    Specify Type:  •	Diastolic CHF or HFpEF   •	Other (specify)    Supporting documentation and/or clinical evidence:   Presented with palpitations, SOB and b/l LE edema x 2 months that worsened overnight  Admitted for Afib RVR and possible new heart failure  Chronic diastolic CHF documented in DC summary  New decompensated documented in progress notes    MEDS:  Furosemide injectable 40 mg IVP on 4/29  Furosemide tablet 20 mg day 5/1    BNP:  Pro-Brain Natriuretic Peptide: 1994: NT-proBNP Interpretive comments:  Acute Congestive Heart Failure is unlikely if NT-proBNP is less than 300  pg/mL, for any age.  Consider Acute Congestive Heart Failure if:  AGE               NT-proBNP Result  ___               ________________  < 50year           > 450 pg/mL  50 - 75 years     > 900 pg/mL  > 75 years         > 1800 pg/ml    TTE:  < from: TTE W or WO Ultrasound Enhancing Agent (04.29.25 @ 11:25) >  CONCLUSIONS:    1. Left ventricular systolic function is normal with an ejection fraction visually estimated at 55 to 60 %.   2. Left atrium is normal in size.   3. Estimated pulmonary artery systolic pressure is 25 mmHg.    FINDINGS:   Left Ventricle:  The left ventricular cavity is small. Left ventricular systolic function is normal with an ejection fraction visually estimated at 55 to 60%. Unable to assess left ventricular diastolic function due to insufficient data.    Attending 5/1 Note:  presented from [facility] (independent, moved there 2 months ago to live with his wife) c/o palpitations x 2 months that worsened overnight, associated with b/l LE edema, SOB, and 2 pillow orthopnea x 2 months. Now, pt is euvolemic, AF with RVR still under management.    #AF with RVR likely 2/2 hMPV  - s/p 60mg dilt po, total of 35mg IV dilt and 20mg IV lasix in ED  - Continue diltiazem and metoprolol   - TTE showing normal EF, pulmonary HTN  #SIRS likely 2/2 hMPV  #AF RVR and possible new decompensated HF  # Pulmonary HTN   - CXR w/ mild pulm vascular congestion appreciate and bibasilar atelectasis R>L and no consolidation/infiltrate noted on wet read; small left base retrocardiac infiltrate which is new since prior.  - s/p 60mg diltiazem po, total of 35mg IV diltiazem and 20mg IV lasix in ED  - hold IVFs given concern for vol overloaded   - hypervolemic on admission, now euvolemic - discontinue IV diuresis, monitor lytes, renal function    ATTESTATION STATEMENTS  89 year old male with Hypertension, Atrial Fibrillation and Dyslipidemia presented with palpitations and dyspnea.  Relates weeks long orthopnea, pedal edema, PND, Cough. No fever  # AF with RVR, improved  # CHF, Diastolic. Improved  # hMPV infection  # HTN    DC:  TTE results showed EF 55 to 60%, normal left atrium size, pulmonary artery pressure of 25 mmHg. Pt s/p diuresis, pt medically improved, will dc with cardiology outpt follow up   # AF with RVR, improved  # CHF, Diastolic. Improved  # hMPV infection  # HTN  # Dyslipidemia    PRINCIPAL DISCHARGE DIAGNOSIS  Diagnosis: Atrial fibrillation with RVR    SECONDARY DISCHARGE DIAGNOSES  Diagnosis: Chronic diastolic congestive heart failure - Continue lasix 20mg daily for 5 days       Thank you      For reference, please see the [facility] Provider CHF Clinical Paper located on the [facility] Intranet - Clinical documentation improvement resources.    Reference Chart for Heart Failure:  SYSTOLIC:  Heart failure with reduced ejection fraction (HFrEF)  -	Evidence of reduced extent of contraction during systole resulting in systolic dysfunction.  -	Low ejection fraction (<40%).	DIASTOLIC:    DIASTOLIC:  Heart failure with preserved ejection fraction (HFpEF)  -	Evidence of EF>40 with echo evidence of diastolic dysfunction.   -	Evidence of normal (greater than or equal to 50) or elevated ejection fraction.

## 2025-06-02 PROCEDURE — 87150 DNA/RNA AMPLIFIED PROBE: CPT

## 2025-06-02 PROCEDURE — 96375 TX/PRO/DX INJ NEW DRUG ADDON: CPT

## 2025-06-02 PROCEDURE — 80048 BASIC METABOLIC PNL TOTAL CA: CPT

## 2025-06-02 PROCEDURE — 80053 COMPREHEN METABOLIC PANEL: CPT

## 2025-06-02 PROCEDURE — 87077 CULTURE AEROBIC IDENTIFY: CPT

## 2025-06-02 PROCEDURE — 84443 ASSAY THYROID STIM HORMONE: CPT

## 2025-06-02 PROCEDURE — 99285 EMERGENCY DEPT VISIT HI MDM: CPT

## 2025-06-02 PROCEDURE — 85730 THROMBOPLASTIN TIME PARTIAL: CPT

## 2025-06-02 PROCEDURE — 83735 ASSAY OF MAGNESIUM: CPT

## 2025-06-02 PROCEDURE — 87640 STAPH A DNA AMP PROBE: CPT

## 2025-06-02 PROCEDURE — 84484 ASSAY OF TROPONIN QUANT: CPT

## 2025-06-02 PROCEDURE — 85610 PROTHROMBIN TIME: CPT

## 2025-06-02 PROCEDURE — 81001 URINALYSIS AUTO W/SCOPE: CPT

## 2025-06-02 PROCEDURE — 36415 COLL VENOUS BLD VENIPUNCTURE: CPT

## 2025-06-02 PROCEDURE — 83880 ASSAY OF NATRIURETIC PEPTIDE: CPT

## 2025-06-02 PROCEDURE — 84145 PROCALCITONIN (PCT): CPT

## 2025-06-02 PROCEDURE — 96374 THER/PROPH/DIAG INJ IV PUSH: CPT

## 2025-06-02 PROCEDURE — 71045 X-RAY EXAM CHEST 1 VIEW: CPT

## 2025-06-02 PROCEDURE — 83036 HEMOGLOBIN GLYCOSYLATED A1C: CPT

## 2025-06-02 PROCEDURE — 87040 BLOOD CULTURE FOR BACTERIA: CPT

## 2025-06-02 PROCEDURE — 93005 ELECTROCARDIOGRAM TRACING: CPT

## 2025-06-02 PROCEDURE — 0225U NFCT DS DNA&RNA 21 SARSCOV2: CPT

## 2025-06-02 PROCEDURE — 84100 ASSAY OF PHOSPHORUS: CPT

## 2025-06-02 PROCEDURE — 85025 COMPLETE CBC W/AUTO DIFF WBC: CPT

## 2025-06-02 PROCEDURE — 80061 LIPID PANEL: CPT

## 2025-06-02 PROCEDURE — 87641 MR-STAPH DNA AMP PROBE: CPT

## 2025-06-02 PROCEDURE — 93306 TTE W/DOPPLER COMPLETE: CPT

## (undated) DEVICE — SUT VICRYL 0 36" CT-1 UNDYED

## (undated) DEVICE — DRSG MASTISOL

## (undated) DEVICE — ELCTR BOVIE TIP NEEDLE IMA/ENT 3/4"

## (undated) DEVICE — GLV 7.5 PROTEXIS (WHITE)

## (undated) DEVICE — PACK OR PLASTIC

## (undated) DEVICE — SUT VICRYL 3-0 27" SH UNDYED

## (undated) DEVICE — SUT SILK 3-0 24" TIES

## (undated) DEVICE — SUT MONOCRYL 4-0 18" PS-2

## (undated) DEVICE — SUT SILK 2-0 30" TIES

## (undated) DEVICE — NDL HYPO REGULAR BEVEL 25G X 1.5" (BLUE)

## (undated) DEVICE — GLV 7 PROTEXIS (WHITE)

## (undated) DEVICE — SYR CONTROL LUER LOK 10CC

## (undated) DEVICE — VENODYNE/SCD SLEEVE CALF MEDIUM

## (undated) DEVICE — GOWN XL

## (undated) DEVICE — DRAPE 1/2 SHEET 40X57"

## (undated) DEVICE — ELCTR GROUNDING PAD ADULT COVIDIEN

## (undated) DEVICE — SUT SILK 2-0 18" FS

## (undated) DEVICE — WARMING BLANKET LOWER ADULT

## (undated) DEVICE — SPONGE PEANUT AUTO COUNT

## (undated) DEVICE — DRAPE TOWEL BLUE 17" X 24"